# Patient Record
Sex: MALE | Race: BLACK OR AFRICAN AMERICAN | NOT HISPANIC OR LATINO | Employment: OTHER | URBAN - METROPOLITAN AREA
[De-identification: names, ages, dates, MRNs, and addresses within clinical notes are randomized per-mention and may not be internally consistent; named-entity substitution may affect disease eponyms.]

---

## 2020-06-17 ENCOUNTER — OFFICE VISIT (OUTPATIENT)
Dept: PRIMARY CARE CLINIC | Facility: CLINIC | Age: 55
End: 2020-06-17
Payer: OTHER GOVERNMENT

## 2020-06-17 VITALS
DIASTOLIC BLOOD PRESSURE: 71 MMHG | OXYGEN SATURATION: 96 % | HEART RATE: 88 BPM | TEMPERATURE: 98 F | RESPIRATION RATE: 18 BRPM | SYSTOLIC BLOOD PRESSURE: 148 MMHG

## 2020-06-17 DIAGNOSIS — U07.1 COVID-19: Primary | ICD-10-CM

## 2020-06-17 PROCEDURE — 99203 PR OFFICE/OUTPT VISIT, NEW, LEVL III, 30-44 MIN: ICD-10-PCS | Mod: S$GLB,,, | Performed by: EMERGENCY MEDICINE

## 2020-06-17 PROCEDURE — 99203 OFFICE O/P NEW LOW 30 MIN: CPT | Mod: S$GLB,,, | Performed by: EMERGENCY MEDICINE

## 2020-06-17 PROCEDURE — U0003 INFECTIOUS AGENT DETECTION BY NUCLEIC ACID (DNA OR RNA); SEVERE ACUTE RESPIRATORY SYNDROME CORONAVIRUS 2 (SARS-COV-2) (CORONAVIRUS DISEASE [COVID-19]), AMPLIFIED PROBE TECHNIQUE, MAKING USE OF HIGH THROUGHPUT TECHNOLOGIES AS DESCRIBED BY CMS-2020-01-R: HCPCS

## 2020-06-17 NOTE — PROGRESS NOTES
Subjective:        Time seen by provider: 1:16 PM on 06/17/2020    Omi Julian is a 55 y.o. male who presents for an evaluation of possible COVID-19. He is asymptomatic and has not been exposed to sick contacts but would like to be tested. No PMHx or PSHx.    Review of Systems   Constitutional: Negative for activity change, appetite change, fatigue and fever.   HENT: Negative for congestion, rhinorrhea and sore throat.    Respiratory: Negative for cough, chest tightness, shortness of breath and wheezing.    Cardiovascular: Negative for chest pain and palpitations.   Gastrointestinal: Negative for diarrhea, nausea and vomiting.   Musculoskeletal: Negative for arthralgias and myalgias.   Skin: Negative for rash.   Neurological: Negative for weakness, light-headedness and headaches.       Objective:      Physical Exam  Vitals signs and nursing note reviewed.   Constitutional:       General: He is not in acute distress.     Appearance: He is well-developed. He is not diaphoretic.   HENT:      Head: Normocephalic and atraumatic.      Nose: Nose normal.   Eyes:      Conjunctiva/sclera: Conjunctivae normal.   Neck:      Musculoskeletal: Normal range of motion.   Cardiovascular:      Rate and Rhythm: Normal rate and regular rhythm.      Heart sounds: Normal heart sounds. No murmur.   Pulmonary:      Effort: Pulmonary effort is normal. No respiratory distress.      Breath sounds: Normal breath sounds. No wheezing.   Musculoskeletal: Normal range of motion.   Skin:     General: Skin is warm and dry.   Neurological:      Mental Status: He is alert and oriented to person, place, and time.         Assessment and Plan:      Diagnoses and all orders for this visit:    COVID-19  -     COVID-19 Routine Screening  - Discharge home and await results.   - Return to clinic or ED for new or worsening symptoms.   - Follow-up with PCP as needed.     Scribe Attestation:   Varsha LEONE, am scribing for, and in the presence of,  Nicole Yeboah PA-C. I performed the above scribed service and the documentation accurately describes the services I performed. I attest to the accuracy of the note.    I, Nicole Yeboah PA-C, personally performed the services described in this documentation. All medical record entries made by the scribe were at my direction and in my presence.  I have reviewed the chart and agree that the record reflects my personal performance and is accurate and complete. Nicole Yeboah PA-C.  2:42 PM 06/17/2020

## 2020-06-17 NOTE — PATIENT INSTRUCTIONS
Instructions for Patients with Confirmed or Suspected COVID-19    If you are awaiting your test result, you will either be called or it will be released to the patient portal.  If you have any questions about your test, please visit [email or web address] or call our COVID-19 information line at [phone].      Preventing the Spread of Coronavirus Disease 2019 (COVID-19) in Homes and Residential Communities -- Patients     Prevention steps for people with confirmed or suspected COVID-19 (including persons under investigation) who do not need to be hospitalized and people with confirmed COVID-19 who were hospitalized and determined to be medically stable to go home.    Stay home except to get medical care.    Separate yourself from other people and animals in your home.    Call ahead before visiting your doctor.    Wear a face mask.    Cover your coughs and sneezes.    Clean your hands often.    Avoid sharing personal household items.    Clean all high-touch surfaces every day.    Monitor your symptoms. Seek prompt medical attention if your illness is worsening (e.g., difficulty breathing). Before seeking care, call your healthcare provider.    If you have a medical emergency and must call 911, notify the dispatcher that you have or are being evaluated for COVID-19. If possible, put on a face mask before emergency medical services arrive.    Use the following symptom-based strategy to return to normal activity following a suspected or confirmed case of COVID-19. Continue isolation until:   o At least 3 days (72 hours) have passed since recovery defined as resolution of fever without the use of fever-reducing medications and improvement in respiratory symptoms (e.g. cough, shortness of breath), and   o At least 10 days have passed since symptoms first appeared.     Precautions for household members, intimate partners and caregivers in a non-healthcare setting of a patient with symptomatic  laboratory-confirmed COVID-19 or a patient under investigation.     Household members, intimate partners and caregivers in a non-healthcare setting may have close contact with a person with symptomatic, laboratory-confirmed COVID-19 or a person under investigation. Close contacts should monitor their health; they should call their healthcare provider right away if they develop symptoms suggestive of COVID-19 (e.g., fever, cough, shortness of breath). Close contacts should also follow these recommendations:      Make sure that you understand and can help the patient follow their healthcare providers instructions for medication(s) and care. You should help the patient with basic needs in the home and provide support for getting groceries, prescriptions, and other personal needs.    Monitor the patients symptoms. If the patient is getting sicker, call his or her healthcare provider and tell them that the patient has laboratory-confirmed COVID-19. This will help the healthcare providers office take steps to keep people in the office or waiting room from getting infected. Ask the healthcare provider to call the local or [facility] health department for additional guidance. If the patient has a medical emergency and you need to call 911, notify the dispatch personnel that the patient has or is being evaluated for COVID-19.    Household members should stay in another room or be  from the patient as much as possible. Household members should use a separate bedroom and bathroom, if available.    Prohibit visitors who do not have an essential need to be in the home.    Household members should care for any pets. Do not handle pets or other animals while sick.    Make sure that shared spaces in the home have good air flow, such as by an air conditioner.    Perform hand hygiene frequently. Wash your hands often with soap and water for at least 20 seconds or use an alcohol-based hand  that contains 60 to  95% alcohol, covering all surfaces of your hands and rubbing them together until they feel dry. Soap and water are preferred if hands are visibly dirty.    Avoid touching your eyes, nose and mouth with unwashed hands.    The patient should wear a face mask when you are around other people. If the patient is not able to wear a face mask (for example, because it causes trouble breathing), you, as the caregiver, should wear a mask when you are in the same room as the patient.    Wear a disposable face mask and gloves when you touch or have contact with the patients blood, stool or body fluids, such as saliva, sputum, nasal mucus, vomit and urine.   o Throw out disposable face masks and gloves after using them. Do not reuse.   o When removing personal protective equipment, first remove and dispose of gloves. Then, immediately clean your hands with soap and water or alcohol-based hand . Next, remove and dispose of face mask, and immediately clean your hands again with soap and water or alcohol-based hand .    Avoid sharing household items with the patient. You should not share dishes, drinking glasses, cups, eating utensils, towels, bedding or other items. After the patient uses these items, you should wash them thoroughly (see below Wash laundry thoroughly).    Clean all high-touch surfaces, such as counters, tabletops, doorknobs, bathroom fixtures, toilets, phones, keyboards, tablets and bedside tables, every day. Also, clean any surfaces that may have blood, stool or body fluids on them.   o Use a household cleaning spray or wipe, according to the label instructions. Labels contain instructions for safe and effective use of the cleaning product including precautions you should take when applying the product, such as wearing gloves and making sure you have good ventilation during use of the product.    Wash laundry thoroughly.   o Immediately remove and wash clothes or bedding that have  blood, stool or body fluids on them.  o Wear disposable gloves while handling soiled items and keep soiled items away from your body. Clean your hands (with soap and water or an alcohol-based hand ) immediately after removing your gloves.   o Read and follow directions on labels of laundry or clothing items and detergent. In general, using a normal laundry detergent according to washing machine instructions and dry thoroughly using the warmest temperatures recommended on the clothing label.    Place all used disposable gloves, face masks and other contaminated items in a lined container before disposing of them with other household waste. Clean your hands (with soap and water or an alcohol-based hand ) immediately after handling these items. Soap and water should be used preferentially if hands are visibly dirty.   Discuss any additional questions with your state or local health department

## 2020-06-18 LAB — SARS-COV-2 RNA RESP QL NAA+PROBE: NOT DETECTED

## 2020-07-07 ENCOUNTER — HOSPITAL ENCOUNTER (EMERGENCY)
Facility: HOSPITAL | Age: 55
Discharge: HOME | End: 2020-07-07
Attending: EMERGENCY MEDICINE
Payer: OTHER GOVERNMENT

## 2020-07-07 VITALS
OXYGEN SATURATION: 96 % | BODY MASS INDEX: 40.43 KG/M2 | RESPIRATION RATE: 20 BRPM | SYSTOLIC BLOOD PRESSURE: 149 MMHG | DIASTOLIC BLOOD PRESSURE: 77 MMHG | TEMPERATURE: 98 F | HEIGHT: 74 IN | WEIGHT: 315 LBS | HEART RATE: 70 BPM

## 2020-07-07 DIAGNOSIS — T78.40XA ALLERGIC REACTION, INITIAL ENCOUNTER: Primary | ICD-10-CM

## 2020-07-07 PROCEDURE — 99284 EMERGENCY DEPT VISIT MOD MDM: CPT | Mod: 25

## 2020-07-07 PROCEDURE — 63600175 PHARM REV CODE 636 W HCPCS: Performed by: EMERGENCY MEDICINE

## 2020-07-07 PROCEDURE — 96372 THER/PROPH/DIAG INJ SC/IM: CPT

## 2020-07-07 PROCEDURE — 25000003 PHARM REV CODE 250: Performed by: EMERGENCY MEDICINE

## 2020-07-07 RX ORDER — PREDNISONE 20 MG/1
40 TABLET ORAL DAILY
Qty: 10 TABLET | Refills: 0 | Status: SHIPPED | OUTPATIENT
Start: 2020-07-07 | End: 2020-07-12

## 2020-07-07 RX ORDER — DEXAMETHASONE SODIUM PHOSPHATE 4 MG/ML
12 INJECTION, SOLUTION INTRA-ARTICULAR; INTRALESIONAL; INTRAMUSCULAR; INTRAVENOUS; SOFT TISSUE
Status: COMPLETED | OUTPATIENT
Start: 2020-07-07 | End: 2020-07-07

## 2020-07-07 RX ORDER — DIPHENHYDRAMINE HCL 25 MG
25 CAPSULE ORAL
Status: COMPLETED | OUTPATIENT
Start: 2020-07-07 | End: 2020-07-07

## 2020-07-07 RX ADMIN — DEXAMETHASONE SODIUM PHOSPHATE 12 MG: 4 INJECTION, SOLUTION INTRAMUSCULAR; INTRAVENOUS at 01:07

## 2020-07-07 RX ADMIN — DIPHENHYDRAMINE HYDROCHLORIDE 25 MG: 25 CAPSULE ORAL at 01:07

## 2020-07-07 NOTE — ED PROVIDER NOTES
Encounter Date: 7/7/2020       History     Chief Complaint   Patient presents with    Allergic Reaction     used hair dye to color beard yesterday / facial oral swelling  / no SOB     55-year-old male presents to the ER with facial swelling after using a hair dye for his beard yesterday.  No difficulty speaking swallowing no wheezing.  No extremity itching.  Patient has not taken any medication for this.  He is noncompliant with his home medications.        Review of patient's allergies indicates:  No Known Allergies  No past medical history on file.  No past surgical history on file.  No family history on file.  Social History     Tobacco Use    Smoking status: Not on file   Substance Use Topics    Alcohol use: Not on file    Drug use: Not on file     Review of Systems   HENT: Positive for facial swelling.    Respiratory: Negative for shortness of breath, wheezing and stridor.    Skin: Negative for rash.       Physical Exam     Initial Vitals [07/07/20 1248]   BP Pulse Resp Temp SpO2   (!) 149/77 70 20 98.2 °F (36.8 °C) 96 %      MAP       --         Physical Exam    Nursing note and vitals reviewed.  Constitutional: He appears well-developed and well-nourished. He is not diaphoretic.  Non-toxic appearance. He does not have a sickly appearance. He does not appear ill. No distress.   HENT:   Head: Normocephalic and atraumatic.   Very minimal swelling near the beard  No oral swelling  No stridor no wheezing   Eyes: EOM are normal.   Neck: Normal range of motion. Neck supple. Normal range of motion present. No neck rigidity.   Pulmonary/Chest: No respiratory distress.   Musculoskeletal: Normal range of motion.   Neurological: He is alert and oriented to person, place, and time.   Skin: Skin is warm and dry. No rash noted.   No urticaria   Psychiatric: He has a normal mood and affect. His behavior is normal. Judgment and thought content normal.         ED Course   Procedures  Labs Reviewed - No data to display        Imaging Results    None                            ED Course as of Jul 07 1459 Tue Jul 07, 2020   1250 BP(!): 149/77 [EF]   1250 Temp: 98.2 °F (36.8 °C) [EF]   1250 Temp src: Oral [EF]   1250 Pulse: 70 [EF]   1250 Resp: 20 [EF]   1250 SpO2: 96 % [EF]   1328 Patient presents for facial swelling and itching after using hair dye.  Symptoms are very mild.  He will be discharged home with oral steroids p.r.n. Benadryl.  No indication for airway involvement.    [EF]      ED Course User Index  [EF] Evans Dias MD                Clinical Impression:       ICD-10-CM ICD-9-CM   1. Allergic reaction, initial encounter  T78.40XA 995.3                                Evans Dias MD  07/07/20 3406

## 2020-07-07 NOTE — ED NOTES
Upon discharge, patient is AAOx4, no cardiac or respiratory complications. Follow up care and  Medications have been reviewed with patient and has been instructed to return to the ER if needed. Patient verbalized understanding and ambulated to the lobby without difficulty. MIKE RENNER.

## 2020-07-07 NOTE — ED NOTES
Omi Julian presents to the ED with c/o allergic reaction to [employer]. Patient reports that he has dyed his beard in the past, but has not had any problem. Patient did change the brand that he uses. Associated complaints are some facial swelling. No difficulty breathing or SOB. Patient is not in any distress. Mucous membranes are pink and moist. Skin is warm, dry and intact. Lungs are clear bilaterally, respirations are regular and unlabored. Denies SOB, cough, congestion or rhinorrhea. BS active x4, no tenderness with palpation, abd is soft and not distended. Denies any appetite or activity change. S1S2, capillary refill is < 2 seconds. Denies dysuria, difficulty urinating, frequency, numbness, tingling or weakness. ZURDO MCKEON

## 2020-12-06 ENCOUNTER — HOSPITAL ENCOUNTER (INPATIENT)
Facility: HOSPITAL | Age: 55
LOS: 4 days | Discharge: HOME | DRG: 300 | End: 2020-12-10
Attending: EMERGENCY MEDICINE | Admitting: INTERNAL MEDICINE
Payer: OTHER GOVERNMENT

## 2020-12-06 DIAGNOSIS — M86.9 OSTEOMYELITIS, UNSPECIFIED: ICD-10-CM

## 2020-12-06 DIAGNOSIS — M86.172 OTHER ACUTE OSTEOMYELITIS OF LEFT FOOT: ICD-10-CM

## 2020-12-06 DIAGNOSIS — R52 PAIN: ICD-10-CM

## 2020-12-06 DIAGNOSIS — E11.52 DIABETIC WET GANGRENE OF THE FOOT: ICD-10-CM

## 2020-12-06 DIAGNOSIS — R73.9 HYPERGLYCEMIA: ICD-10-CM

## 2020-12-06 DIAGNOSIS — L03.90 CELLULITIS: Primary | ICD-10-CM

## 2020-12-06 LAB
ALBUMIN SERPL BCP-MCNC: 4.1 G/DL (ref 3.5–5.2)
ALLENS TEST: ABNORMAL
ALP SERPL-CCNC: 107 U/L (ref 55–135)
ALT SERPL W/O P-5'-P-CCNC: 36 U/L (ref 10–44)
ANION GAP SERPL CALC-SCNC: 10 MMOL/L (ref 8–16)
AST SERPL-CCNC: 22 U/L (ref 10–40)
B-OH-BUTYR BLD STRIP-SCNC: 0.1 MMOL/L (ref 0–0.5)
BACTERIA #/AREA URNS HPF: NEGATIVE /HPF
BASOPHILS # BLD AUTO: 0.03 K/UL (ref 0–0.2)
BASOPHILS NFR BLD: 0.4 % (ref 0–1.9)
BILIRUB SERPL-MCNC: 0.8 MG/DL (ref 0.1–1)
BILIRUB UR QL STRIP: NEGATIVE
BUN SERPL-MCNC: 23 MG/DL (ref 6–20)
CALCIUM SERPL-MCNC: 9.5 MG/DL (ref 8.7–10.5)
CHLORIDE SERPL-SCNC: 94 MMOL/L (ref 95–110)
CLARITY UR: CLEAR
CO2 SERPL-SCNC: 24 MMOL/L (ref 23–29)
COLOR UR: COLORLESS
CREAT SERPL-MCNC: 1.4 MG/DL (ref 0.5–1.4)
CRP SERPL-MCNC: 0.98 MG/DL
DELSYS: ABNORMAL
DIFFERENTIAL METHOD: ABNORMAL
EOSINOPHIL # BLD AUTO: 0.1 K/UL (ref 0–0.5)
EOSINOPHIL NFR BLD: 1.5 % (ref 0–8)
ERYTHROCYTE [DISTWIDTH] IN BLOOD BY AUTOMATED COUNT: 12.5 % (ref 11.5–14.5)
EST. GFR  (AFRICAN AMERICAN): >60 ML/MIN/1.73 M^2
EST. GFR  (NON AFRICAN AMERICAN): 55.8 ML/MIN/1.73 M^2
GLUCOSE SERPL-MCNC: 505 MG/DL (ref 70–110)
GLUCOSE SERPL-MCNC: 539 MG/DL (ref 70–110)
GLUCOSE UR QL STRIP: ABNORMAL
HCO3 UR-SCNC: 26.7 MMOL/L (ref 24–28)
HCT VFR BLD AUTO: 39.6 % (ref 40–54)
HGB BLD-MCNC: 13 G/DL (ref 14–18)
HGB UR QL STRIP: NEGATIVE
HYALINE CASTS #/AREA URNS LPF: 0 /LPF
IMM GRANULOCYTES # BLD AUTO: 0.05 K/UL (ref 0–0.04)
IMM GRANULOCYTES NFR BLD AUTO: 0.6 % (ref 0–0.5)
KETONES UR QL STRIP: NEGATIVE
LACTATE SERPL-SCNC: 1.8 MMOL/L (ref 0.5–1.9)
LEUKOCYTE ESTERASE UR QL STRIP: NEGATIVE
LYMPHOCYTES # BLD AUTO: 2.4 K/UL (ref 1–4.8)
LYMPHOCYTES NFR BLD: 30.1 % (ref 18–48)
MCH RBC QN AUTO: 28.3 PG (ref 27–31)
MCHC RBC AUTO-ENTMCNC: 32.8 G/DL (ref 32–36)
MCV RBC AUTO: 86 FL (ref 82–98)
MICROSCOPIC COMMENT: ABNORMAL
MONOCYTES # BLD AUTO: 0.8 K/UL (ref 0.3–1)
MONOCYTES NFR BLD: 10.2 % (ref 4–15)
NEUTROPHILS # BLD AUTO: 4.5 K/UL (ref 1.8–7.7)
NEUTROPHILS NFR BLD: 57.2 % (ref 38–73)
NITRITE UR QL STRIP: NEGATIVE
NRBC BLD-RTO: 0 /100 WBC
PCO2 BLDA: 45.4 MMHG (ref 35–45)
PH SMN: 7.38 [PH] (ref 7.35–7.45)
PH UR STRIP: 7 [PH] (ref 5–8)
PLATELET # BLD AUTO: 208 K/UL (ref 150–350)
PMV BLD AUTO: 10.9 FL (ref 9.2–12.9)
PO2 BLDA: 60 MMHG (ref 40–60)
POC BE: 1 MMOL/L
POC SATURATED O2: 90 % (ref 95–100)
POC TCO2: 28 MMOL/L (ref 24–29)
POTASSIUM SERPL-SCNC: 4.4 MMOL/L (ref 3.5–5.1)
PROT SERPL-MCNC: 7.1 G/DL (ref 6–8.4)
PROT UR QL STRIP: NEGATIVE
RBC # BLD AUTO: 4.59 M/UL (ref 4.6–6.2)
RBC #/AREA URNS HPF: 2 /HPF (ref 0–4)
SAMPLE: ABNORMAL
SARS-COV-2 RDRP RESP QL NAA+PROBE: NEGATIVE
SITE: ABNORMAL
SODIUM SERPL-SCNC: 128 MMOL/L (ref 136–145)
SP GR UR STRIP: 1.02 (ref 1–1.03)
SQUAMOUS #/AREA URNS HPF: 0 /HPF
URN SPEC COLLECT METH UR: ABNORMAL
UROBILINOGEN UR STRIP-ACNC: NEGATIVE EU/DL
WBC # BLD AUTO: 7.93 K/UL (ref 3.9–12.7)
WBC #/AREA URNS HPF: 0 /HPF (ref 0–5)
YEAST URNS QL MICRO: ABNORMAL

## 2020-12-06 PROCEDURE — 83605 ASSAY OF LACTIC ACID: CPT

## 2020-12-06 PROCEDURE — 80053 COMPREHEN METABOLIC PANEL: CPT

## 2020-12-06 PROCEDURE — 96367 TX/PROPH/DG ADDL SEQ IV INF: CPT

## 2020-12-06 PROCEDURE — 82010 KETONE BODYS QUAN: CPT

## 2020-12-06 PROCEDURE — 99285 EMERGENCY DEPT VISIT HI MDM: CPT | Mod: 25

## 2020-12-06 PROCEDURE — 81001 URINALYSIS AUTO W/SCOPE: CPT

## 2020-12-06 PROCEDURE — 12000002 HC ACUTE/MED SURGE SEMI-PRIVATE ROOM

## 2020-12-06 PROCEDURE — 82803 BLOOD GASES ANY COMBINATION: CPT

## 2020-12-06 PROCEDURE — 83880 ASSAY OF NATRIURETIC PEPTIDE: CPT

## 2020-12-06 PROCEDURE — 82962 GLUCOSE BLOOD TEST: CPT

## 2020-12-06 PROCEDURE — 99900035 HC TECH TIME PER 15 MIN (STAT)

## 2020-12-06 PROCEDURE — 85651 RBC SED RATE NONAUTOMATED: CPT

## 2020-12-06 PROCEDURE — 96365 THER/PROPH/DIAG IV INF INIT: CPT

## 2020-12-06 PROCEDURE — 93010 EKG 12-LEAD: ICD-10-PCS | Mod: ,,, | Performed by: INTERNAL MEDICINE

## 2020-12-06 PROCEDURE — 93005 ELECTROCARDIOGRAM TRACING: CPT | Performed by: INTERNAL MEDICINE

## 2020-12-06 PROCEDURE — 86140 C-REACTIVE PROTEIN: CPT

## 2020-12-06 PROCEDURE — 36415 COLL VENOUS BLD VENIPUNCTURE: CPT

## 2020-12-06 PROCEDURE — 63600175 PHARM REV CODE 636 W HCPCS: Performed by: EMERGENCY MEDICINE

## 2020-12-06 PROCEDURE — 83036 HEMOGLOBIN GLYCOSYLATED A1C: CPT

## 2020-12-06 PROCEDURE — 99900031 HC PATIENT EDUCATION (STAT)

## 2020-12-06 PROCEDURE — 84145 PROCALCITONIN (PCT): CPT

## 2020-12-06 PROCEDURE — 85025 COMPLETE CBC W/AUTO DIFF WBC: CPT

## 2020-12-06 PROCEDURE — U0002 COVID-19 LAB TEST NON-CDC: HCPCS

## 2020-12-06 PROCEDURE — 87040 BLOOD CULTURE FOR BACTERIA: CPT

## 2020-12-06 PROCEDURE — 93010 ELECTROCARDIOGRAM REPORT: CPT | Mod: ,,, | Performed by: INTERNAL MEDICINE

## 2020-12-06 RX ORDER — CLINDAMYCIN HYDROCHLORIDE 300 MG/1
300 CAPSULE ORAL EVERY 6 HOURS
Status: ON HOLD | COMMUNITY
End: 2020-12-10 | Stop reason: HOSPADM

## 2020-12-06 RX ORDER — VANCOMYCIN HCL IN 5 % DEXTROSE 1G/250ML
2000 PLASTIC BAG, INJECTION (ML) INTRAVENOUS ONCE
Status: COMPLETED | OUTPATIENT
Start: 2020-12-06 | End: 2020-12-07

## 2020-12-06 RX ADMIN — SODIUM CHLORIDE, SODIUM LACTATE, POTASSIUM CHLORIDE, AND CALCIUM CHLORIDE 1000 ML: .6; .31; .03; .02 INJECTION, SOLUTION INTRAVENOUS at 11:12

## 2020-12-06 RX ADMIN — VANCOMYCIN HYDROCHLORIDE 2000 MG: 1 INJECTION, POWDER, LYOPHILIZED, FOR SOLUTION INTRAVENOUS at 11:12

## 2020-12-06 RX ADMIN — PIPERACILLIN AND TAZOBACTAM 4.5 G: 4; .5 INJECTION, POWDER, LYOPHILIZED, FOR SOLUTION INTRAVENOUS; PARENTERAL at 11:12

## 2020-12-07 PROBLEM — R73.9 HYPERGLYCEMIA: Status: ACTIVE | Noted: 2020-12-07

## 2020-12-07 PROBLEM — L08.9 DIABETIC FOOT INFECTION: Status: ACTIVE | Noted: 2020-12-07

## 2020-12-07 PROBLEM — E11.52 DIABETIC WET GANGRENE OF THE FOOT: Status: ACTIVE | Noted: 2020-12-07

## 2020-12-07 PROBLEM — E11.9 TYPE 2 DIABETES MELLITUS: Status: ACTIVE | Noted: 2020-12-07

## 2020-12-07 PROBLEM — I10 HYPERTENSION: Status: ACTIVE | Noted: 2020-12-07

## 2020-12-07 PROBLEM — E66.01 MORBID OBESITY WITH BMI OF 45.0-49.9, ADULT: Status: ACTIVE | Noted: 2020-12-07

## 2020-12-07 PROBLEM — E11.628 DIABETIC FOOT INFECTION: Status: ACTIVE | Noted: 2020-12-07

## 2020-12-07 LAB
ANION GAP SERPL CALC-SCNC: 6 MMOL/L (ref 8–16)
BASOPHILS # BLD AUTO: 0.02 K/UL (ref 0–0.2)
BASOPHILS NFR BLD: 0.3 % (ref 0–1.9)
BNP SERPL-MCNC: 8 PG/ML (ref 0–99)
BUN SERPL-MCNC: 22 MG/DL (ref 6–20)
CALCIUM SERPL-MCNC: 8.8 MG/DL (ref 8.7–10.5)
CHLORIDE SERPL-SCNC: 94 MMOL/L (ref 95–110)
CO2 SERPL-SCNC: 28 MMOL/L (ref 23–29)
CREAT SERPL-MCNC: 1.2 MG/DL (ref 0.5–1.4)
DIFFERENTIAL METHOD: ABNORMAL
EOSINOPHIL # BLD AUTO: 0.1 K/UL (ref 0–0.5)
EOSINOPHIL NFR BLD: 1.9 % (ref 0–8)
ERYTHROCYTE [DISTWIDTH] IN BLOOD BY AUTOMATED COUNT: 12.6 % (ref 11.5–14.5)
ERYTHROCYTE [SEDIMENTATION RATE] IN BLOOD BY WESTERGREN METHOD: 9 MM/HR (ref 0–10)
EST. GFR  (AFRICAN AMERICAN): >60 ML/MIN/1.73 M^2
EST. GFR  (NON AFRICAN AMERICAN): >60 ML/MIN/1.73 M^2
ESTIMATED AVG GLUCOSE: 226 MG/DL (ref 68–131)
GLUCOSE SERPL-MCNC: 322 MG/DL (ref 70–110)
GLUCOSE SERPL-MCNC: 334 MG/DL (ref 70–110)
GLUCOSE SERPL-MCNC: 371 MG/DL (ref 70–110)
GLUCOSE SERPL-MCNC: 377 MG/DL (ref 70–110)
GLUCOSE SERPL-MCNC: 415 MG/DL (ref 70–110)
GLUCOSE SERPL-MCNC: 438 MG/DL (ref 70–110)
HBA1C MFR BLD HPLC: 9.5 % (ref 4.5–6.2)
HCT VFR BLD AUTO: 37.9 % (ref 40–54)
HGB BLD-MCNC: 12.5 G/DL (ref 14–18)
IMM GRANULOCYTES # BLD AUTO: 0.06 K/UL (ref 0–0.04)
IMM GRANULOCYTES NFR BLD AUTO: 0.9 % (ref 0–0.5)
LACTATE SERPL-SCNC: 1 MMOL/L (ref 0.5–1.9)
LYMPHOCYTES # BLD AUTO: 2 K/UL (ref 1–4.8)
LYMPHOCYTES NFR BLD: 29.2 % (ref 18–48)
MAGNESIUM SERPL-MCNC: 1.8 MG/DL (ref 1.6–2.6)
MCH RBC QN AUTO: 29.1 PG (ref 27–31)
MCHC RBC AUTO-ENTMCNC: 33 G/DL (ref 32–36)
MCV RBC AUTO: 88 FL (ref 82–98)
MONOCYTES # BLD AUTO: 0.7 K/UL (ref 0.3–1)
MONOCYTES NFR BLD: 10.6 % (ref 4–15)
NEUTROPHILS # BLD AUTO: 3.9 K/UL (ref 1.8–7.7)
NEUTROPHILS NFR BLD: 57.1 % (ref 38–73)
NRBC BLD-RTO: 0 /100 WBC
PLATELET # BLD AUTO: 192 K/UL (ref 150–350)
PMV BLD AUTO: 10.6 FL (ref 9.2–12.9)
POTASSIUM SERPL-SCNC: 4.1 MMOL/L (ref 3.5–5.1)
PROCALCITONIN SERPL IA-MCNC: <0.05 NG/ML (ref 0–0.5)
RBC # BLD AUTO: 4.3 M/UL (ref 4.6–6.2)
SODIUM SERPL-SCNC: 128 MMOL/L (ref 136–145)
WBC # BLD AUTO: 6.78 K/UL (ref 3.9–12.7)

## 2020-12-07 PROCEDURE — 25000003 PHARM REV CODE 250: Performed by: NURSE PRACTITIONER

## 2020-12-07 PROCEDURE — 63600175 PHARM REV CODE 636 W HCPCS: Performed by: NURSE PRACTITIONER

## 2020-12-07 PROCEDURE — 12000002 HC ACUTE/MED SURGE SEMI-PRIVATE ROOM

## 2020-12-07 PROCEDURE — 63600175 PHARM REV CODE 636 W HCPCS: Performed by: INTERNAL MEDICINE

## 2020-12-07 PROCEDURE — 25000003 PHARM REV CODE 250: Performed by: FAMILY MEDICINE

## 2020-12-07 PROCEDURE — 83735 ASSAY OF MAGNESIUM: CPT

## 2020-12-07 PROCEDURE — 25000003 PHARM REV CODE 250: Performed by: INTERNAL MEDICINE

## 2020-12-07 PROCEDURE — 80048 BASIC METABOLIC PNL TOTAL CA: CPT

## 2020-12-07 PROCEDURE — 99222 PR INITIAL HOSPITAL CARE,LEVL II: ICD-10-PCS | Mod: ,,, | Performed by: PODIATRIST

## 2020-12-07 PROCEDURE — 83605 ASSAY OF LACTIC ACID: CPT

## 2020-12-07 PROCEDURE — 85025 COMPLETE CBC W/AUTO DIFF WBC: CPT

## 2020-12-07 PROCEDURE — 99222 1ST HOSP IP/OBS MODERATE 55: CPT | Mod: ,,, | Performed by: PODIATRIST

## 2020-12-07 PROCEDURE — C9399 UNCLASSIFIED DRUGS OR BIOLOG: HCPCS | Performed by: FAMILY MEDICINE

## 2020-12-07 PROCEDURE — 36415 COLL VENOUS BLD VENIPUNCTURE: CPT

## 2020-12-07 RX ORDER — MAGNESIUM SULFATE 1 G/100ML
1 INJECTION INTRAVENOUS
Status: DISCONTINUED | OUTPATIENT
Start: 2020-12-07 | End: 2020-12-10 | Stop reason: HOSPADM

## 2020-12-07 RX ORDER — POTASSIUM CHLORIDE 7.45 MG/ML
20 INJECTION INTRAVENOUS
Status: DISCONTINUED | OUTPATIENT
Start: 2020-12-07 | End: 2020-12-10 | Stop reason: HOSPADM

## 2020-12-07 RX ORDER — LANOLIN ALCOHOL/MO/W.PET/CERES
800 CREAM (GRAM) TOPICAL
Status: DISCONTINUED | OUTPATIENT
Start: 2020-12-07 | End: 2020-12-10 | Stop reason: HOSPADM

## 2020-12-07 RX ORDER — ACETAMINOPHEN 325 MG/1
650 TABLET ORAL EVERY 4 HOURS PRN
Status: DISCONTINUED | OUTPATIENT
Start: 2020-12-07 | End: 2020-12-10 | Stop reason: HOSPADM

## 2020-12-07 RX ORDER — INSULIN ASPART 100 [IU]/ML
1-10 INJECTION, SOLUTION INTRAVENOUS; SUBCUTANEOUS
Status: DISCONTINUED | OUTPATIENT
Start: 2020-12-07 | End: 2020-12-09

## 2020-12-07 RX ORDER — MAGNESIUM SULFATE HEPTAHYDRATE 40 MG/ML
4 INJECTION, SOLUTION INTRAVENOUS
Status: DISCONTINUED | OUTPATIENT
Start: 2020-12-07 | End: 2020-12-10 | Stop reason: HOSPADM

## 2020-12-07 RX ORDER — IBUPROFEN 200 MG
16 TABLET ORAL
Status: DISCONTINUED | OUTPATIENT
Start: 2020-12-07 | End: 2020-12-09

## 2020-12-07 RX ORDER — POTASSIUM CHLORIDE 20 MEQ/1
40 TABLET, EXTENDED RELEASE ORAL
Status: DISCONTINUED | OUTPATIENT
Start: 2020-12-07 | End: 2020-12-10 | Stop reason: HOSPADM

## 2020-12-07 RX ORDER — HYDROCODONE BITARTRATE AND ACETAMINOPHEN 10; 325 MG/1; MG/1
1 TABLET ORAL EVERY 6 HOURS PRN
Status: DISCONTINUED | OUTPATIENT
Start: 2020-12-07 | End: 2020-12-10 | Stop reason: HOSPADM

## 2020-12-07 RX ORDER — POTASSIUM CHLORIDE 7.45 MG/ML
40 INJECTION INTRAVENOUS
Status: DISCONTINUED | OUTPATIENT
Start: 2020-12-07 | End: 2020-12-10 | Stop reason: HOSPADM

## 2020-12-07 RX ORDER — SODIUM CHLORIDE 0.9 % (FLUSH) 0.9 %
10 SYRINGE (ML) INJECTION
Status: DISCONTINUED | OUTPATIENT
Start: 2020-12-07 | End: 2020-12-10 | Stop reason: HOSPADM

## 2020-12-07 RX ORDER — MAGNESIUM SULFATE HEPTAHYDRATE 40 MG/ML
2 INJECTION, SOLUTION INTRAVENOUS
Status: DISCONTINUED | OUTPATIENT
Start: 2020-12-07 | End: 2020-12-10 | Stop reason: HOSPADM

## 2020-12-07 RX ORDER — ACETAMINOPHEN 325 MG/1
650 TABLET ORAL EVERY 8 HOURS PRN
Status: DISCONTINUED | OUTPATIENT
Start: 2020-12-07 | End: 2020-12-10 | Stop reason: HOSPADM

## 2020-12-07 RX ORDER — POTASSIUM CHLORIDE 20 MEQ/1
20 TABLET, EXTENDED RELEASE ORAL
Status: DISCONTINUED | OUTPATIENT
Start: 2020-12-07 | End: 2020-12-10 | Stop reason: HOSPADM

## 2020-12-07 RX ORDER — SODIUM CHLORIDE 9 MG/ML
INJECTION, SOLUTION INTRAVENOUS CONTINUOUS
Status: DISCONTINUED | OUTPATIENT
Start: 2020-12-07 | End: 2020-12-10 | Stop reason: HOSPADM

## 2020-12-07 RX ORDER — ONDANSETRON 2 MG/ML
4 INJECTION INTRAMUSCULAR; INTRAVENOUS EVERY 8 HOURS PRN
Status: DISCONTINUED | OUTPATIENT
Start: 2020-12-07 | End: 2020-12-10 | Stop reason: HOSPADM

## 2020-12-07 RX ORDER — IBUPROFEN 200 MG
24 TABLET ORAL
Status: DISCONTINUED | OUTPATIENT
Start: 2020-12-07 | End: 2020-12-09

## 2020-12-07 RX ORDER — ENOXAPARIN SODIUM 100 MG/ML
40 INJECTION SUBCUTANEOUS EVERY 12 HOURS
Status: DISCONTINUED | OUTPATIENT
Start: 2020-12-07 | End: 2020-12-10 | Stop reason: HOSPADM

## 2020-12-07 RX ORDER — GLUCAGON 1 MG
1 KIT INJECTION
Status: DISCONTINUED | OUTPATIENT
Start: 2020-12-07 | End: 2020-12-09

## 2020-12-07 RX ADMIN — INSULIN ASPART 10 UNITS: 100 INJECTION, SOLUTION INTRAVENOUS; SUBCUTANEOUS at 12:12

## 2020-12-07 RX ADMIN — INSULIN ASPART 10 UNITS: 100 INJECTION, SOLUTION INTRAVENOUS; SUBCUTANEOUS at 08:12

## 2020-12-07 RX ADMIN — PIPERACILLIN SODIUM AND TAZOBACTAM SODIUM 4.5 G: 4; .5 INJECTION, POWDER, LYOPHILIZED, FOR SOLUTION INTRAVENOUS at 04:12

## 2020-12-07 RX ADMIN — ENOXAPARIN SODIUM 40 MG: 40 INJECTION SUBCUTANEOUS at 08:12

## 2020-12-07 RX ADMIN — SODIUM CHLORIDE 100 ML/HR: 0.9 INJECTION, SOLUTION INTRAVENOUS at 01:12

## 2020-12-07 RX ADMIN — PIPERACILLIN SODIUM AND TAZOBACTAM SODIUM 4.5 G: 4; .5 INJECTION, POWDER, LYOPHILIZED, FOR SOLUTION INTRAVENOUS at 08:12

## 2020-12-07 RX ADMIN — VANCOMYCIN HYDROCHLORIDE 2000 MG: 500 INJECTION, POWDER, LYOPHILIZED, FOR SOLUTION INTRAVENOUS at 12:12

## 2020-12-07 RX ADMIN — INSULIN ASPART 8 UNITS: 100 INJECTION, SOLUTION INTRAVENOUS; SUBCUTANEOUS at 04:12

## 2020-12-07 RX ADMIN — PIPERACILLIN SODIUM AND TAZOBACTAM SODIUM 4.5 G: 4; .5 INJECTION, POWDER, LYOPHILIZED, FOR SOLUTION INTRAVENOUS at 11:12

## 2020-12-07 RX ADMIN — INSULIN ASPART 5 UNITS: 100 INJECTION, SOLUTION INTRAVENOUS; SUBCUTANEOUS at 01:12

## 2020-12-07 RX ADMIN — SODIUM CHLORIDE: 0.9 INJECTION, SOLUTION INTRAVENOUS at 04:12

## 2020-12-07 RX ADMIN — ENOXAPARIN SODIUM 40 MG: 40 INJECTION SUBCUTANEOUS at 09:12

## 2020-12-07 RX ADMIN — INSULIN DETEMIR 20 UNITS: 100 INJECTION, SOLUTION SUBCUTANEOUS at 10:12

## 2020-12-07 NOTE — PROGRESS NOTES
VANCOMYCIN PHARMACOKINETIC NOTE:  Vancomycin Day # 1    Objective/Assessment:    Diagnosis/Indication for Vancomycin: Cellulitis     55 y.o., male; Actual Body Weight = (!) 168.7 kg (371 lb 14.7 oz).    The patient has the following labs:  12/7/2020 Estimated Creatinine Clearance: 97.3 mL/min (based on SCr of 1.4 mg/dL). Lab Results   Component Value Date    BUN 23 (H) 12/06/2020     Lab Results   Component Value Date    WBC 7.93 12/06/2020            Plan:  Adjust vancomycin dose and/or frequency based on the patient's actual weight and renal function:  Initiate Vancomycin 2000 mg IV every 12 hours.  Orders have been entered into patient's chart.        Vancomycin trough level has been ordered for 12/8 @11:00, prior to 4th dose.    Pharmacy will manage vancomycin therapy, monitor serum vancomycin levels, monitor renal function and adjust regimen as necessary.    Thank you for allowing us to participate in this patient's care.     Maxx Dodson 12/7/2020 1:10 AM  Department of Pharmacy  Ext [phone]

## 2020-12-07 NOTE — ASSESSMENT & PLAN NOTE
Systolic blood pressure in the 140s to 150s in the ED  Not on antihypertensive medication  Monitor

## 2020-12-07 NOTE — PROGRESS NOTES
"[facility]  Progress Note    Patient Name: Omi Julian  MRN: [id number]  Patient Class: IP- Inpatient   Admission Date: 12/6/2020 10:28 PM  Attending Physician: Jesi Valenzuela MD  Face-to-Face encounter date: 12/07/2020    Assessment & Plan:   Omi Julian is a 55 y.o. male with:    Diabetic Foot Ulcer with Gangrene   Suspected osteomyelitis  Pt with recent surgery and metal implant  - podiatry discussing with radiology re: best imaging technique  - vanc, zosyn  - possible repeat debridement per podiatry    DM2  - pt reported that he is not diabetic  - we discussed this in detail  - diabetic education   - SSI  - starting basal  - adjusting insulin prn    Other chronic conditions:  Home meds as appropriate  Electrolyte derangement:  Trending BMP, Mg; Replacement prn  DVT ppx:  lovenox  FULL CODE    Discharge Planning:     Will evaluate.    Subjective:      12/7:  Pt denies being diabetic.  He reports numbness but no pain in BLE.  No CP, no SOB.    Review of Systems   All systems reviewed and are negative except as noted per above.  Objective:     Physical Exam  /76   Pulse 77   Temp 98.6 °F (37 °C) (Oral)   Resp 18   Ht 6' 2" (1.88 m)   Wt (!) 168.7 kg (371 lb 14.7 oz)   SpO2 97%   BMI 47.75 kg/m²     Gen: alert, responsive  HEENT:  Eyes - no pallor  External ears with no lesions  Nares patent  Mouth - lips chapped  CV: RRR  Lungs: CTA B/L  Abd: +BS, soft, NT, ND  Ext: +edema, ulceration, induration of L foot  Skin: warm, dry  Neuro: grossly intact  Psych: pleasant    Recent Labs   Lab 12/07/20  0525   WBC 6.78   HGB 12.5*   HCT 37.9*        Recent Labs   Lab 12/06/20  2252 12/07/20  0525   CALCIUM 9.5 8.8   ALBUMIN 4.1  --    PROT 7.1  --    * 128*   K 4.4 4.1   CO2 24 28   CL 94* 94*   BUN 23* 22*   CREATININE 1.4 1.2   ALKPHOS 107  --    ALT 36  --    AST 22  --    BILITOT 0.8  --      No results found for: POCTGLUCOSE   Microbiology " Results (last 7 days)     Procedure Component Value Units Date/Time    Blood culture x two cultures. Draw prior to antibiotics. [[id number]] Collected: 12/06/20 2247    Order Status: Completed Specimen: Blood from Peripheral, Wrist, Right Updated: 12/07/20 0558     Blood Culture, Routine No Growth to date    Narrative:      Aerobic and anaerobic    Blood culture x two cultures. Draw prior to antibiotics. [[id number]] Collected: 12/06/20 2247    Order Status: Completed Specimen: Blood from Peripheral, Hand, Left Updated: 12/07/20 0558     Blood Culture, Routine No Growth to date    Narrative:      Aerobic and anaerobic        X-Ray Chest AP Portable   Final Result      X-Ray Foot Complete Left   Final Result          All labs, images, and other studies - including those not included in this note -- were reviewed personally by me.    Active Hospital Problems    Diagnosis  POA    *Diabetic foot infection [E11.628, L08.9]  Yes    Type 2 diabetes mellitus [E11.9]  Yes    Hypertension [I10]  Yes    Morbid obesity with BMI of 45.0-49.9, adult [E66.01, Z68.42]  Not Applicable    Cellulitis [L03.90]  Yes      Resolved Hospital Problems   No resolved problems to display.        Inpatient medications  Scheduled Meds:   enoxparin  40 mg Subcutaneous Q12H    piperacillin-tazobactam (ZOSYN) IVPB  4.5 g Intravenous Q8H    sodium chloride 0.9%  1,000 mL Intravenous ED 1 Time    vancomycin (VANCOCIN) IVPB  2,000 mg Intravenous Q12H     Continuous Infusions:   sodium chloride 0.9% 100 mL/hr (12/07/20 0122)     PRN Meds:.acetaminophen, acetaminophen, calcium chloride IVPB, calcium chloride IVPB, calcium chloride IVPB, dextrose 50%, dextrose 50%, glucagon (human recombinant), glucose, glucose, HYDROcodone-acetaminophen, insulin aspart U-100, magnesium oxide, magnesium sulfate IVPB, magnesium sulfate IVPB, magnesium sulfate IVPB, magnesium sulfate IVPB, ondansetron, potassium chloride in water, potassium chloride in water,  potassium chloride in water, potassium chloride in water, potassium chloride, potassium chloride, potassium chloride, potassium chloride, promethazine (PHENERGAN) IVPB, sodium chloride 0.9%, sodium phosphate IVPB, sodium phosphate IVPB, sodium phosphate IVPB, sodium phosphate IVPB, sodium phosphate IVPB, Pharmacy to dose Vancomycin consult **AND** vancomycin - pharmacy to dose    Above encounter included review of the medical records, interviewing and examining the patient face-to-face, discussion with family and other health care providers, ordering and interpreting lab/test results and formulating a plan of care.     Jesi Valenzuela MD  [facility] Hospitalist

## 2020-12-07 NOTE — CONSULTS
Left foot dark discoloration of the great, 2nd, 3rd toes.  2nd toe is swollen with a 0.4x1.6x0.3cm plantar ulcer yellow slough wound bed, peeling plantar toe.  Tenderness noted.  Cleaned and dried.  Waiting for orders from Dr. Andrade

## 2020-12-07 NOTE — ED PROVIDER NOTES
Encounter Date: 12/6/2020       History     Chief Complaint   Patient presents with    Hyperglycemia     500+ blood glocose. surgery on foot with poor healing. BG >300 x 4 days     55-year-old male presented emergency department with elevated blood sugar over the past 3-4 days.  Patient had consistently high blood sugars over 300 with a past 3-4 days.  Patient also has a nonhealing ulcer and wound of the left foot and has a dressing in place.  Patient on clindamycin for treatment of that.  Patient had surgery of the left foot about few months ago and since then has persistent infection.  Denies fever or chills or nausea vomiting.  Denies chest pain or shortness of breath or abdominal pain or weakness or numbness.        Review of patient's allergies indicates:  No Known Allergies  No past medical history on file.  No past surgical history on file.  No family history on file.  Social History     Tobacco Use    Smoking status: Not on file   Substance Use Topics    Alcohol use: Not on file    Drug use: Not on file     Review of Systems   Constitutional: Positive for fatigue.   HENT: Negative.    Eyes: Negative.    Respiratory: Negative.    Cardiovascular: Negative.  Negative for chest pain.   Gastrointestinal: Negative.    Endocrine: Positive for polydipsia, polyphagia and polyuria.   Genitourinary: Negative.    Musculoskeletal: Negative.    Skin: Negative.    Allergic/Immunologic: Negative.    Neurological: Negative.    Hematological: Negative.    Psychiatric/Behavioral: Negative.    All other systems reviewed and are negative.      Physical Exam     Initial Vitals   BP Pulse Resp Temp SpO2   12/06/20 2230 12/06/20 2230 12/06/20 2230 12/06/20 2231 12/06/20 2230   (!) 147/81 92 18 98.3 °F (36.8 °C) 98 %      MAP       --                Physical Exam    Nursing note and vitals reviewed.  Constitutional: He appears well-developed and well-nourished.   HENT:   Head: Normocephalic and atraumatic.   Nose: Nose normal.    Eyes: Conjunctivae and EOM are normal.   Neck: Normal range of motion. No tracheal deviation present.   Cardiovascular: Normal rate, regular rhythm, normal heart sounds and intact distal pulses. Exam reveals no friction rub.    No murmur heard.  Pulmonary/Chest: Breath sounds normal. No respiratory distress. He has no wheezes. He has no rales.   Abdominal: Soft. He exhibits no distension. There is no abdominal tenderness.   Musculoskeletal: Normal range of motion. No edema.      Comments: Left foot dark and discolored and drainage of pus from the toes which appear to be infected.  Intact distal pulses.   Neurological: He is alert and oriented to person, place, and time. He has normal strength. No sensory deficit. GCS score is 15. GCS eye subscore is 4. GCS verbal subscore is 5. GCS motor subscore is 6.   Skin: Skin is warm and dry. Capillary refill takes less than 2 seconds.   Psychiatric: He has a normal mood and affect. Thought content normal.         ED Course   Procedures  Labs Reviewed   CBC W/ AUTO DIFFERENTIAL - Abnormal; Notable for the following components:       Result Value    RBC 4.59 (*)     Hemoglobin 13.0 (*)     Hematocrit 39.6 (*)     Immature Granulocytes 0.6 (*)     Immature Grans (Abs) 0.05 (*)     All other components within normal limits   COMPREHENSIVE METABOLIC PANEL - Abnormal; Notable for the following components:    Sodium 128 (*)     Chloride 94 (*)     Glucose 539 (*)     BUN 23 (*)     eGFR if non  55.8 (*)     All other components within normal limits    Narrative:      Glucose  critical result(s) called and verbal readback obtained from   Alexis Curran RN ER. by TC1 12/06/2020 23:35   URINALYSIS, REFLEX TO URINE CULTURE - Abnormal; Notable for the following components:    Color, UA Colorless (*)     Glucose, UA 4+ (*)     All other components within normal limits    Narrative:     Specimen Source->Urine   C-REACTIVE PROTEIN - Abnormal; Notable for the following  components:    CRP 0.98 (*)     All other components within normal limits   URINALYSIS MICROSCOPIC - Abnormal; Notable for the following components:    Hyaline Casts, UA 0.00 (*)     All other components within normal limits    Narrative:     Specimen Source->Urine   POCT GLUCOSE - Abnormal; Notable for the following components:    POC Glucose 505 (*)     All other components within normal limits   ISTAT PROCEDURE - Abnormal; Notable for the following components:    POC PCO2 45.4 (*)     POC SATURATED O2 90 (*)     All other components within normal limits   CULTURE, BLOOD   CULTURE, BLOOD   BETA - HYDROXYBUTYRATE, SERUM   LACTIC ACID, PLASMA   SARS-COV-2 RNA AMPLIFICATION, QUAL   PROCALCITONIN   SEDIMENTATION RATE   POCT GLUCOSE MONITORING CONTINUOUS     EKG Readings: (Independently Interpreted)   Rhythm: Normal Sinus Rhythm. Ectopy: No Ectopy. Conduction: Normal. ST Segments: Normal ST Segments. T Waves: Normal. Clinical Impression: Normal Sinus Rhythm       Imaging Results          X-Ray Chest AP Portable (In process)                X-Ray Foot Complete Left (In process)               X-Rays:   Independently Interpreted Readings:   Other Readings:  Foot xray osteomyelitis  cxr wnl    Medical Decision Making:   Differential Diagnosis:   Patient with hyperglycemia and has diabetes which likely is new onset.  Patient does not have previous diagnosis of diabetes.  Patient also has nonhealing left foot wound and gangrenous appearing toes with open wound at the base of the toes with open wound with drainage of pus and given the presentation of this for the past 2-3 months likely developed osteomyelitis.  Patient also failing outpatient treatment with antibiotics.  Given this presentation will treat hyperglycemia and nonhealing wound and osteomyelitis will treat with antibiotics.  Hospital Medicine consulted for evaluation for admission and further management.  X-ray it done and patient would likely need treatment and  wound debridement and evaluation by Podiatry  Clinical Tests:   Lab Tests: Reviewed  Radiological Study: Reviewed                             Clinical Impression:       ICD-10-CM ICD-9-CM   1. Other acute osteomyelitis of left foot  M86.172 730.07   2. Hyperglycemia  R73.9 790.29   3. Pain  R52 780.96                                               Vik Cedeno MD  12/06/20 2545       Vik Cedeno MD  12/06/20 7692

## 2020-12-07 NOTE — HPI
"55-year-old male morbidly obese male with history of SELMA, on CPAP ("should use more often")  Presented to the emergency department with elevated blood sugar, over 300s for the past 3-4 days.   Also reports nonhealing ulcers on the left foot. [facility]  States that he was given 120 tablets of clindamycin qid in October. Completed a couple days ago.  [facility] about [facility] weeks ago and was given "oxygen boot therapy"  Reports fatigue with polyphasia, polydipsia, and polyuria  Has approximately 40 lbs weight gain in the past 4 months  Denies fever, chills  Denies shortness of breath, chest pain, abdominal pain, nausea, vomiting, diarrhea, dysuria  Reports occasional mild to moderate pain on left foot, able to bear weight    Drinks about 2 hard liquor daily  Denies tobacco or recreational drugs  Not on routine medication  Told that he had "boarderline diabetes" many years ago.     In the ED:  Afebrile  Mildly hypertensive  WBC 7.93, hemoglobin 13, hematocrit 39.6, platelet 200  Sodium 128, potassium 4.8, glucose 539  Corrected sodium 135  Lactic acid 1.8  Beta hydroxy butyrate 0.1  Urine with 4+ glucose glucose, no urinary tract infection  Blood cultures done  Started on IV Zosyn  and IV vancomycin  Received 1 L IV bolus in the ED  "

## 2020-12-07 NOTE — ASSESSMENT & PLAN NOTE
Not in DKA  Await for hemoglobin A1c  Received 1 L IV fluid in the ED  Continue normal saline at 100 mL/hr  Monitor blood glucose  Medium dose sliding scale insulin  Diabetic diet

## 2020-12-07 NOTE — PLAN OF CARE
Initial discharge planning assessment completed at bedside with patient. Independent. [facility]. No needs identified at this time. Case management following.      12/07/20 1120   Discharge Assessment   Assessment Type Discharge Planning Assessment   Confirmed/corrected address and phone number on facesheet? Yes   Assessment information obtained from? Patient   Expected Length of Stay (days) 3   Communicated expected length of stay with patient/caregiver yes   Prior to hospitilization cognitive status: Alert/Oriented   Prior to hospitalization functional status: Independent   Current cognitive status: Alert/Oriented   Current Functional Status: Independent   Lives With significant other   Able to Return to Prior Arrangements yes   Is patient able to care for self after discharge? Yes   Readmission Within the Last 30 Days no previous admission in last 30 days   Patient currently being followed by outpatient case management? No   Patient currently receives any other outside agency services? No   Equipment Currently Used at Home none   [facility]   Do you have any problems affording any of your prescribed medications? No   Is the patient taking medications as prescribed? yes   Does the patient have transportation home? Yes   Transportation Anticipated family or friend will provide   Discharge Plan A Home   Discharge Plan B Home   DME Needed Upon Discharge  none   Patient/Family in Agreement with Plan yes

## 2020-12-07 NOTE — ASSESSMENT & PLAN NOTE
ED physician reports purulent drainage from toes  Concerns for osteomyelitis  Completed clindamycin 120 tablets, qid  IV Zosyn and IV vancomycin started in the ED, will continue  Follow blood cultures  Consult podiatry

## 2020-12-07 NOTE — PLAN OF CARE
Problem: Adult Inpatient Plan of Care  Goal: Plan of Care Review  Outcome: Ongoing, Progressing  Goal: Patient-Specific Goal (Individualization)  Outcome: Ongoing, Progressing  Goal: Absence of Hospital-Acquired Illness or Injury  Outcome: Ongoing, Progressing  Goal: Optimal Comfort and Wellbeing  Outcome: Ongoing, Progressing  Goal: Readiness for Transition of Care  Outcome: Ongoing, Progressing  Goal: Rounds/Family Conference  Outcome: Ongoing, Progressing     Problem: Bariatric Environmental Safety  Goal: Safety Maintained with Care  Outcome: Ongoing, Progressing     Problem: Fall Injury Risk  Goal: Absence of Fall and Fall-Related Injury  Outcome: Ongoing, Progressing     Problem: Skin Injury Risk Increased  Goal: Skin Health and Integrity  Outcome: Ongoing, Progressing     Problem: Diabetes Comorbidity  Goal: Blood Glucose Level Within Desired Range  Outcome: Ongoing, Progressing

## 2020-12-07 NOTE — SUBJECTIVE & OBJECTIVE
Past Medical History:   Diagnosis Date    Sleep apnea        History reviewed. No pertinent surgical history.    Review of patient's allergies indicates:  No Known Allergies    No current facility-administered medications on file prior to encounter.      Current Outpatient Medications on File Prior to Encounter   Medication Sig    clindamycin (CLEOCIN) 300 MG capsule Take 300 mg by mouth every 6 (six) hours.     Family History     Problem Relation (Age of Onset)    Diabetes Mother, Father    Hypertension Mother, Father        Tobacco Use    Smoking status: Never Smoker   Substance and Sexual Activity    Alcohol use: Yes     Alcohol/week: 14.0 standard drinks     Types: 14 Shots of liquor per week    Drug use: Never    Sexual activity: Not on file     Review of Systems   All other systems reviewed and are negative.    Objective:     Vital Signs (Most Recent):  Temp: 98.3 °F (36.8 °C) (12/06/20 2231)  Pulse: 87 (12/06/20 2300)  Resp: 18 (12/06/20 2230)  BP: (!) 144/74 (12/06/20 2300)  SpO2: 96 % (12/06/20 2300) Vital Signs (24h Range):  Temp:  [98.3 °F (36.8 °C)] 98.3 °F (36.8 °C)  Pulse:  [87-92] 87  Resp:  [18] 18  SpO2:  [96 %-98 %] 96 %  BP: (144-147)/(74-81) 144/74     Weight: (!) 167.8 kg (370 lb)  Body mass index is 46.25 kg/m².    Physical Exam  Vitals signs reviewed.   Constitutional:       Appearance: He is obese. He is not ill-appearing or diaphoretic.      Comments: A large and obese male   HENT:      Head: Normocephalic and atraumatic.   Eyes:      General: No scleral icterus.        Right eye: No discharge.         Left eye: No discharge.      Conjunctiva/sclera: Conjunctivae normal.   Neck:      Musculoskeletal: No muscular tenderness.      Comments: Short thick neck  Cardiovascular:      Rate and Rhythm: Normal rate and regular rhythm.      Pulses: Normal pulses.      Heart sounds: Normal heart sounds.   Pulmonary:      Effort: Pulmonary effort is normal.      Breath sounds: No wheezing or rales.    Abdominal:      Comments: Large   Genitourinary:     Comments: No [place]  Musculoskeletal:      Left lower leg: Left lower leg edema: Edema left foot.   Skin:     Comments: Dark discoloration left foot/toes, see image   Neurological:      General: No focal deficit present.      Mental Status: He is alert and oriented to person, place, and time.   Psychiatric:         Mood and Affect: Mood normal.         Behavior: Behavior normal.             Significant Labs:   ABGs:   Recent Labs   Lab 12/06/20 2259   PH 7.377   PCO2 45.4*   HCO3 26.7   POCSATURATED 90*   BE 1     CBC:   Recent Labs   Lab 12/06/20 2252   WBC 7.93   HGB 13.0*   HCT 39.6*        CMP:   Recent Labs   Lab 12/06/20 2252   *   K 4.4   CL 94*   CO2 24   *   BUN 23*   CREATININE 1.4   CALCIUM 9.5   PROT 7.1   ALBUMIN 4.1   BILITOT 0.8   ALKPHOS 107   AST 22   ALT 36   ANIONGAP 10   EGFRNONAA 55.8*     Lactic Acid:   Recent Labs   Lab 12/06/20  2252   LACTATE 1.8     Urine Studies:   Recent Labs   Lab 12/06/20  2316   COLORU Colorless*   APPEARANCEUA Clear   PHUR 7.0   SPECGRAV 1.025   PROTEINUA Negative   GLUCUA 4+*   KETONESU Negative   BILIRUBINUA Negative   OCCULTUA Negative   NITRITE Negative   UROBILINOGEN Negative   LEUKOCYTESUR Negative   RBCUA 2   WBCUA 0   BACTERIA Negative   SQUAMEPITHEL 0   HYALINECASTS 0.00*     All pertinent labs within the past 24 hours have been reviewed.    Significant Imaging: I have reviewed all pertinent imaging results/findings within the past 24 hours.   Chest radiograph, no obvious infiltrates or overt heart failure  Left foot, report pending

## 2020-12-07 NOTE — H&P
"[facility] Medicine  History & Physical    Patient Name: Omi Julian  MRN: [id number]  Admission Date: 12/6/2020  Attending Physician: Eber Giordano MD   Primary Care Provider: [facility]         Patient information was obtained from patient and ER records.     Subjective:     Principal Problem:Hyperglycemia    Chief Complaint:   Chief Complaint   Patient presents with    Hyperglycemia     500+ blood glocose. surgery on foot with poor healing. BG >300 x 4 days        HPI: 55-year-old male morbidly obese male with history of SELMA, on CPAP ("should use more often")  Presented to the emergency department with elevated blood sugar, over 300s for the past 3-4 days.   Also reports nonhealing ulcers on the left foot. [facility]  States that he was given 120 tablets of clindamycin qid in October. Completed a couple days ago.  [facility] about [facility] ago and was given "oxygen boot therapy"  Reports fatigue with polyphasia, polydipsia, and polyuria  Has approximately 40 lbs weight gain in the past 4 months  Denies fever, chills  Denies shortness of breath, chest pain, abdominal pain, nausea, vomiting, diarrhea, dysuria  Reports occasional mild to moderate pain on left foot, able to bear weight    Drinks about 2 hard liquor daily  Denies tobacco or recreational drugs  Not on routine medication  Told that he had "boarderline diabetes" many years ago.     In the ED:  Afebrile  Mildly hypertensive  WBC 7.93, hemoglobin 13, hematocrit 39.6, platelet 200  Sodium 128, potassium 4.8, glucose 539  Corrected sodium 135  Lactic acid 1.8  Beta hydroxy butyrate 0.1  Urine with 4+ glucose glucose, no urinary tract infection  Blood cultures done  Started on IV Zosyn  and IV vancomycin  Received 1 L IV bolus in the ED    Past Medical History:   Diagnosis Date    Sleep apnea        History reviewed. No pertinent surgical " history.    Review of patient's allergies indicates:  No Known Allergies    No current facility-administered medications on file prior to encounter.      Current Outpatient Medications on File Prior to Encounter   Medication Sig    clindamycin (CLEOCIN) 300 MG capsule Take 300 mg by mouth every 6 (six) hours.     Family History     Problem Relation (Age of Onset)    Diabetes Mother, Father    Hypertension Mother, Father        Tobacco Use    Smoking status: Never Smoker   Substance and Sexual Activity    Alcohol use: Yes     Alcohol/week: 14.0 standard drinks     Types: 14 Shots of liquor per week    Drug use: Never    Sexual activity: Not on file     Review of Systems   All other systems reviewed and are negative.    Objective:     Vital Signs (Most Recent):  Temp: 98.3 °F (36.8 °C) (12/06/20 2231)  Pulse: 87 (12/06/20 2300)  Resp: 18 (12/06/20 2230)  BP: (!) 144/74 (12/06/20 2300)  SpO2: 96 % (12/06/20 2300) Vital Signs (24h Range):  Temp:  [98.3 °F (36.8 °C)] 98.3 °F (36.8 °C)  Pulse:  [87-92] 87  Resp:  [18] 18  SpO2:  [96 %-98 %] 96 %  BP: (144-147)/(74-81) 144/74     Weight: (!) 167.8 kg (370 lb)  Body mass index is 46.25 kg/m².    Physical Exam  Vitals signs reviewed.   Constitutional:       Appearance: He is obese. He is not ill-appearing or diaphoretic.      Comments: A large and obese male   HENT:      Head: Normocephalic and atraumatic.   Eyes:      General: No scleral icterus.        Right eye: No discharge.         Left eye: No discharge.      Conjunctiva/sclera: Conjunctivae normal.   Neck:      Musculoskeletal: No muscular tenderness.      Comments: Short thick neck  Cardiovascular:      Rate and Rhythm: Normal rate and regular rhythm.      Pulses: Normal pulses.      Heart sounds: Normal heart sounds.   Pulmonary:      Effort: Pulmonary effort is normal.      Breath sounds: No wheezing or rales.   Abdominal:      Comments: Large   Genitourinary:     Comments: No [place]  Musculoskeletal:      Left  lower leg: Left lower leg edema: Edema left foot.   Skin:     Comments: Dark discoloration left foot/toes, see image   Neurological:      General: No focal deficit present.      Mental Status: He is alert and oriented to person, place, and time.   Psychiatric:         Mood and Affect: Mood normal.         Behavior: Behavior normal.             Significant Labs:   ABGs:   Recent Labs   Lab 12/06/20  2259   PH 7.377   PCO2 45.4*   HCO3 26.7   POCSATURATED 90*   BE 1     CBC:   Recent Labs   Lab 12/06/20  2252   WBC 7.93   HGB 13.0*   HCT 39.6*        CMP:   Recent Labs   Lab 12/06/20  2252   *   K 4.4   CL 94*   CO2 24   *   BUN 23*   CREATININE 1.4   CALCIUM 9.5   PROT 7.1   ALBUMIN 4.1   BILITOT 0.8   ALKPHOS 107   AST 22   ALT 36   ANIONGAP 10   EGFRNONAA 55.8*     Lactic Acid:   Recent Labs   Lab 12/06/20  2252   LACTATE 1.8     Urine Studies:   Recent Labs   Lab 12/06/20  2316   COLORU Colorless*   APPEARANCEUA Clear   PHUR 7.0   SPECGRAV 1.025   PROTEINUA Negative   GLUCUA 4+*   KETONESU Negative   BILIRUBINUA Negative   OCCULTUA Negative   NITRITE Negative   UROBILINOGEN Negative   LEUKOCYTESUR Negative   RBCUA 2   WBCUA 0   BACTERIA Negative   SQUAMEPITHEL 0   HYALINECASTS 0.00*     All pertinent labs within the past 24 hours have been reviewed.    Significant Imaging: I have reviewed all pertinent imaging results/findings within the past 24 hours.   Chest radiograph, no obvious infiltrates or overt heart failure  Left foot, report pending              Assessment/Plan:     * Hyperglycemia  Not in DKA  Await for hemoglobin A1c  Received 1 L IV fluid in the ED  Continue normal saline at 100 mL/hr  Monitor blood glucose  Medium dose sliding scale insulin  Diabetic diet      Cellulitis  ED physician reports purulent drainage from toes  Concerns for osteomyelitis  Completed clindamycin 120 tablets, qid  IV Zosyn and IV vancomycin started in the ED, will continue  Follow blood cultures  Consult  podiatry      Hypertension  Systolic blood pressure in the 140s to 150s in the ED  Not on antihypertensive medication  Monitor      Morbid obesity with BMI of 45.0-49.9, adult  Need weight reduction      VTE Risk Mitigation (From admission, onward)         Ordered     enoxaparin injection 40 mg  Every 12 hours      12/07/20 0017     IP VTE HIGH RISK PATIENT  Once      12/07/20 0017                   COLBY Whatley  Department of Hospital Medicine   [facility]

## 2020-12-07 NOTE — PLAN OF CARE
Problem: Adult Inpatient Plan of Care  Goal: Plan of Care Review  Outcome: Ongoing, Progressing  Goal: Patient-Specific Goal (Individualization)  Outcome: Ongoing, Progressing  Goal: Absence of Hospital-Acquired Illness or Injury  Outcome: Ongoing, Progressing  Goal: Optimal Comfort and Wellbeing  Outcome: Ongoing, Progressing  Goal: Readiness for Transition of Care  Outcome: Ongoing, Progressing  Goal: Rounds/Family Conference  Outcome: Ongoing, Progressing     Problem: Bariatric Environmental Safety  Goal: Safety Maintained with Care  Outcome: Ongoing, Progressing     Problem: Fall Injury Risk  Goal: Absence of Fall and Fall-Related Injury  Outcome: Ongoing, Progressing     Problem: Skin Injury Risk Increased  Goal: Skin Health and Integrity  Outcome: Ongoing, Progressing

## 2020-12-07 NOTE — RESPIRATORY THERAPY
This note also relates to the following rows which could not be included:  SpO2 - Cannot attach notes to unvalidated device data  Pulse - Cannot attach notes to unvalidated device data       12/06/20 2257   Patient Assessment/Suction   Level of Consciousness (AVPU) alert   PRE-TX-O2   O2 Device (Oxygen Therapy) room air   Education   $ Education Other (see comment);15 min  (vbg)   Labs   $ Was an ABG obtained? Venous Line;ISTAT - Blood gas   $ Labs Tech Time 15 min   Respiratory Evaluation   $ Care Plan Tech Time 15 min   Evaluation For New Orders        12/06/20 2257   Patient Assessment/Suction   Level of Consciousness (AVPU) alert   PRE-TX-O2   O2 Device (Oxygen Therapy) room air   Education   $ Education Other (see comment);15 min  (vbg)   Labs   $ Was an ABG obtained? Venous Line;ISTAT - Blood gas   $ Labs Tech Time 15 min   Respiratory Evaluation   $ Care Plan Tech Time 15 min   Evaluation For New Orders

## 2020-12-07 NOTE — CONSULTS
[facility]  Podiatry  Consult Note    Patient Name: Omi Julian  MRN: [id number]  Admission Date: 12/6/2020  Hospital Length of Stay: 1 days  Attending Physician: Eber Giordano MD  Primary Care Provider: [facility]     Inpatient consult to Podiatry  Consult performed by: Enrique Andrade DPM  Consult ordered by: COLBY Limon        Subjective:     History of Present Illness:  Patient is status post foot surgery left foot in September through the [facility].  He states he has had swelling and discoloration of foot ever since surgery.  The discoloration has fluctuated and seems to be a little bit better actually the nerve was a few days ago.  [facility] and local wound care and local oxygen therapy.  He states it has not resolve the problem.  Blood sugars have been extremely out a control is admitted through the emergency department for evaluation.  I am being asked look at the left foot decide with this is infection or with the problem might be.    Scheduled Meds:   enoxparin  40 mg Subcutaneous Q12H    piperacillin-tazobactam (ZOSYN) IVPB  4.5 g Intravenous Q8H    sodium chloride 0.9%  1,000 mL Intravenous ED 1 Time    vancomycin (VANCOCIN) IVPB  2,000 mg Intravenous Q12H     Continuous Infusions:   sodium chloride 0.9% 100 mL/hr (12/07/20 0122)     PRN Meds:acetaminophen, acetaminophen, calcium chloride IVPB, calcium chloride IVPB, calcium chloride IVPB, dextrose 50%, dextrose 50%, glucagon (human recombinant), glucose, glucose, HYDROcodone-acetaminophen, insulin aspart U-100, magnesium oxide, magnesium sulfate IVPB, magnesium sulfate IVPB, magnesium sulfate IVPB, magnesium sulfate IVPB, ondansetron, potassium chloride in water, potassium chloride in water, potassium chloride in water, potassium chloride in water, potassium chloride, potassium chloride, potassium chloride, potassium chloride,  promethazine (PHENERGAN) IVPB, sodium chloride 0.9%, sodium phosphate IVPB, sodium phosphate IVPB, sodium phosphate IVPB, sodium phosphate IVPB, sodium phosphate IVPB, Pharmacy to dose Vancomycin consult **AND** vancomycin - pharmacy to dose    Review of patient's allergies indicates:  No Known Allergies     Past Medical History:   Diagnosis Date    Hypertension 12/7/2020    Sleep apnea      History reviewed. No pertinent surgical history.    Family History     Problem Relation (Age of Onset)    Diabetes Mother, Father    Hypertension Mother, Father        Tobacco Use    Smoking status: Never Smoker   Substance and Sexual Activity    Alcohol use: Yes     Alcohol/week: 14.0 standard drinks     Types: 14 Shots of liquor per week    Drug use: Never    Sexual activity: Not on file     Review of Systems  Objective:     Vital Signs (Most Recent):  Temp: 98.4 °F (36.9 °C) (12/07/20 0439)  Pulse: 78 (12/07/20 0439)  Resp: 18 (12/07/20 0439)  BP: 136/76 (12/07/20 0439)  SpO2: 98 % (12/07/20 0439) Vital Signs (24h Range):  Temp:  [98.3 °F (36.8 °C)-98.6 °F (37 °C)] 98.4 °F (36.9 °C)  Pulse:  [78-92] 78  Resp:  [18-19] 18  SpO2:  [96 %-98 %] 98 %  BP: (128-155)/(72-81) 136/76     Weight: (!) 168.7 kg (371 lb 14.7 oz)  Body mass index is 47.75 kg/m².    Foot Exam    Laboratory:  CBC, ESR, CMP, C-reactive protein, all of the labs that were pertinent.  Recent Labs   Lab 12/07/20  0525   WBC 6.78   RBC 4.30*   HGB 12.5*   HCT 37.9*      MCV 88   MCH 29.1   MCHC 33.0     CMP:   Recent Labs   Lab 12/06/20  2252 12/07/20  0525   * 415*   CALCIUM 9.5 8.8   ALBUMIN 4.1  --    PROT 7.1  --    * 128*   K 4.4 4.1   CO2 24 28   CL 94* 94*   BUN 23* 22*   CREATININE 1.4 1.2   ALKPHOS 107  --    ALT 36  --    AST 22  --    BILITOT 0.8  --      CRP:   Recent Labs   Lab 12/06/20 2252   CRP 0.98*     ESR:   Recent Labs   Lab 12/06/20 2252   SEDRATE 9       Diagnostic Results:  X-Ray: I have reviewed all pertinent  results/findings within the past 24 hours.  The x-rays show previous foot surgery with an Akin osteotomy with screw fixation, bunionectomy, shortening osteotomy 2nd 3rd metatarsal head with screw fixation, arthroplasty PIPJ toes 2 and 3 on the left foot.  I see no obvious gas in the tissue no obvious bone destruction that is consistent with osteomyelitis.  There is no calcification been in the vascular still structures of the foot.    Clinical Findings:  There was moderate edema, pain, an ulceration and indurated at the dorsal and distal forefoot.              Assessment/Plan:     Active Diagnoses:    Diagnosis Date Noted POA    PRINCIPAL PROBLEM:  Hyperglycemia [R73.9] 12/07/2020 Yes    Hypertension [I10] 12/07/2020 Yes    Morbid obesity with BMI of 45.0-49.9, adult [E66.01, Z68.42] 12/07/2020 Not Applicable    Cellulitis [L03.90] 12/06/2020 Yes      Problems Resolved During this Admission:       Assessment:  Cellulitis versus postoperative edema left foot.  Uncontrolled diabetes mellitus.    Plan:  The exam today is not obvious at this is an abscess formation although there may be 1 area of potential early superficial formation of bulla.  There is a small ulcer at the plantar aspect of the 2nd toe did does not appear to be fluctuant I do not see any purulent drainage.  X-rays show that the surgery is going on heal normally.  Problem I run into is trying to decide how we can decide whether this is infection and anything that needs to be surgically approach since an MRI will be distorted by the metal and a 3 phase bone scan will be hot secondary to previous surgery.  I will discuss with Radiology with that think would be the best test to Jeremiah whether that would possibly be a CT scan.  If the area becomes fluctuance and come still head that up do incision drainage for cultures.  There is a possibility that with antibiotics and local wound care that this may go on to heal.  I will follow up with the patient once I  get more information as to whether I need to do any in incision and drainage.    Thank you for your consult. I will follow-up with patient. Please contact us if you have any additional questions.    Enrique Andrade DPM  Podiatry  [facility]

## 2020-12-08 LAB
ANION GAP SERPL CALC-SCNC: 8 MMOL/L (ref 8–16)
BASOPHILS # BLD AUTO: 0.02 K/UL (ref 0–0.2)
BASOPHILS NFR BLD: 0.4 % (ref 0–1.9)
BUN SERPL-MCNC: 20 MG/DL (ref 6–20)
CALCIUM SERPL-MCNC: 8.8 MG/DL (ref 8.7–10.5)
CHLORIDE SERPL-SCNC: 96 MMOL/L (ref 95–110)
CO2 SERPL-SCNC: 26 MMOL/L (ref 23–29)
CREAT SERPL-MCNC: 1.2 MG/DL (ref 0.5–1.4)
DIFFERENTIAL METHOD: ABNORMAL
EOSINOPHIL # BLD AUTO: 0.1 K/UL (ref 0–0.5)
EOSINOPHIL NFR BLD: 2.3 % (ref 0–8)
ERYTHROCYTE [DISTWIDTH] IN BLOOD BY AUTOMATED COUNT: 12.3 % (ref 11.5–14.5)
EST. GFR  (AFRICAN AMERICAN): >60 ML/MIN/1.73 M^2
EST. GFR  (NON AFRICAN AMERICAN): >60 ML/MIN/1.73 M^2
GLUCOSE SERPL-MCNC: 209 MG/DL (ref 70–110)
GLUCOSE SERPL-MCNC: 353 MG/DL (ref 70–110)
GLUCOSE SERPL-MCNC: 356 MG/DL (ref 70–110)
GLUCOSE SERPL-MCNC: 360 MG/DL (ref 70–110)
GLUCOSE SERPL-MCNC: 363 MG/DL (ref 70–110)
HCT VFR BLD AUTO: 38.6 % (ref 40–54)
HGB BLD-MCNC: 12.4 G/DL (ref 14–18)
IMM GRANULOCYTES # BLD AUTO: 0.04 K/UL (ref 0–0.04)
IMM GRANULOCYTES NFR BLD AUTO: 0.7 % (ref 0–0.5)
LYMPHOCYTES # BLD AUTO: 1.7 K/UL (ref 1–4.8)
LYMPHOCYTES NFR BLD: 29.3 % (ref 18–48)
MAGNESIUM SERPL-MCNC: 1.8 MG/DL (ref 1.6–2.6)
MCH RBC QN AUTO: 27.8 PG (ref 27–31)
MCHC RBC AUTO-ENTMCNC: 32.1 G/DL (ref 32–36)
MCV RBC AUTO: 87 FL (ref 82–98)
MONOCYTES # BLD AUTO: 0.6 K/UL (ref 0.3–1)
MONOCYTES NFR BLD: 11.2 % (ref 4–15)
NEUTROPHILS # BLD AUTO: 3.2 K/UL (ref 1.8–7.7)
NEUTROPHILS NFR BLD: 56.1 % (ref 38–73)
NRBC BLD-RTO: 0 /100 WBC
PLATELET # BLD AUTO: 189 K/UL (ref 150–350)
PMV BLD AUTO: 10.9 FL (ref 9.2–12.9)
POTASSIUM SERPL-SCNC: 4.4 MMOL/L (ref 3.5–5.1)
RBC # BLD AUTO: 4.46 M/UL (ref 4.6–6.2)
SODIUM SERPL-SCNC: 130 MMOL/L (ref 136–145)
VANCOMYCIN TROUGH SERPL-MCNC: 10.9 UG/ML (ref 10–22)
WBC # BLD AUTO: 5.63 K/UL (ref 3.9–12.7)

## 2020-12-08 PROCEDURE — 36415 COLL VENOUS BLD VENIPUNCTURE: CPT

## 2020-12-08 PROCEDURE — C9399 UNCLASSIFIED DRUGS OR BIOLOG: HCPCS | Performed by: INTERNAL MEDICINE

## 2020-12-08 PROCEDURE — 12000002 HC ACUTE/MED SURGE SEMI-PRIVATE ROOM

## 2020-12-08 PROCEDURE — 25000003 PHARM REV CODE 250: Performed by: NURSE PRACTITIONER

## 2020-12-08 PROCEDURE — 94761 N-INVAS EAR/PLS OXIMETRY MLT: CPT

## 2020-12-08 PROCEDURE — 63600175 PHARM REV CODE 636 W HCPCS: Performed by: INTERNAL MEDICINE

## 2020-12-08 PROCEDURE — 99900035 HC TECH TIME PER 15 MIN (STAT)

## 2020-12-08 PROCEDURE — 25000003 PHARM REV CODE 250: Performed by: INTERNAL MEDICINE

## 2020-12-08 PROCEDURE — 85025 COMPLETE CBC W/AUTO DIFF WBC: CPT

## 2020-12-08 PROCEDURE — 63600175 PHARM REV CODE 636 W HCPCS: Performed by: NURSE PRACTITIONER

## 2020-12-08 PROCEDURE — 80048 BASIC METABOLIC PNL TOTAL CA: CPT

## 2020-12-08 PROCEDURE — 80202 ASSAY OF VANCOMYCIN: CPT

## 2020-12-08 PROCEDURE — 83735 ASSAY OF MAGNESIUM: CPT

## 2020-12-08 PROCEDURE — 82962 GLUCOSE BLOOD TEST: CPT

## 2020-12-08 PROCEDURE — 99900031 HC PATIENT EDUCATION (STAT)

## 2020-12-08 RX ADMIN — PIPERACILLIN SODIUM AND TAZOBACTAM SODIUM 4.5 G: 4; .5 INJECTION, POWDER, LYOPHILIZED, FOR SOLUTION INTRAVENOUS at 07:12

## 2020-12-08 RX ADMIN — MAGNESIUM OXIDE 800 MG: 400 TABLET ORAL at 08:12

## 2020-12-08 RX ADMIN — INSULIN DETEMIR 20 UNITS: 100 INJECTION, SOLUTION SUBCUTANEOUS at 08:12

## 2020-12-08 RX ADMIN — INSULIN DETEMIR 15 UNITS: 100 INJECTION, SOLUTION SUBCUTANEOUS at 08:12

## 2020-12-08 RX ADMIN — INSULIN ASPART 10 UNITS: 100 INJECTION, SOLUTION INTRAVENOUS; SUBCUTANEOUS at 03:12

## 2020-12-08 RX ADMIN — VANCOMYCIN HYDROCHLORIDE 2000 MG: 500 INJECTION, POWDER, LYOPHILIZED, FOR SOLUTION INTRAVENOUS at 11:12

## 2020-12-08 RX ADMIN — ENOXAPARIN SODIUM 40 MG: 40 INJECTION SUBCUTANEOUS at 08:12

## 2020-12-08 RX ADMIN — INSULIN ASPART 10 UNITS: 100 INJECTION, SOLUTION INTRAVENOUS; SUBCUTANEOUS at 07:12

## 2020-12-08 RX ADMIN — PIPERACILLIN SODIUM AND TAZOBACTAM SODIUM 4.5 G: 4; .5 INJECTION, POWDER, LYOPHILIZED, FOR SOLUTION INTRAVENOUS at 03:12

## 2020-12-08 RX ADMIN — VANCOMYCIN HYDROCHLORIDE 2000 MG: 500 INJECTION, POWDER, LYOPHILIZED, FOR SOLUTION INTRAVENOUS at 02:12

## 2020-12-08 RX ADMIN — INSULIN ASPART 10 UNITS: 100 INJECTION, SOLUTION INTRAVENOUS; SUBCUTANEOUS at 11:12

## 2020-12-08 RX ADMIN — SODIUM CHLORIDE 100 ML/HR: 0.9 INJECTION, SOLUTION INTRAVENOUS at 08:12

## 2020-12-08 NOTE — PROGRESS NOTES
Identifying data:  55 year male    History of present illness is possible infection left foot ulcer left 2nd toe [facility]  He states his right foot also turned black like this in and resultant time and his left foot actually did looks pedis is right foot did 0.7 problem resolving.      Objective:  Examination is unchanged    Assessment:  1 cellulitis?  Left foot 2.  Osteomyelitis?  Left foot    Plan:  Never did hear back from Radiology as to whether a CT scan versus a white blood cell take 3 phase bone scan would be better.  Today in of water a CT scan of the left foot which will have less distortion and MRI see if there is any way thickened tell me if there is any deep infection or bone infection.  Once again I will recheck the Radiology to see if they think a white blood cell tagged 3 phase bone scan would also be of benefit.  Was a gets results of CT scan and make a decision as to whether any further surgical procedures are indicated.  I will follow with the patient. Wound care will continue local wound care.

## 2020-12-08 NOTE — PROGRESS NOTES
VANCOMYCIN PHARMACOKINETIC NOTE:  Vancomycin Day #3    Objective:    55 y.o., male, Actual Body Weight = (!) 168.7 kg (371 lb 14.7 oz)    Diagnosis/Indication for Vancomycin:  Cellulitis   Desired Vancomycin Peak:  30-35 mcg/ml; Desired Trough: 10-15 mcg/ml    Diagnosis/Indication for Vancomycin:  Osteomyelitis   Desired Vancomycin Peak:  35-40 mcg/ml; Desired Trough: 15-20 mcg/ml    Current Vancomycin Regimen:  2000 mg IV every 12 hours    Antibiotics (From admission, onward)      Start     Stop Route Frequency Ordered    12/08/20 2200  vancomycin (VANCOCIN) 2,000 mg in dextrose 5 % 500 mL IVPB      -- IV Every 8 hours 12/08/20 1656    12/07/20 0800  piperacillin-tazobactam 4.5 g in dextrose 5 % 100 mL IVPB (ready to mix system)      -- IV Every 8 hours (non-standard times) 12/07/20 0013    12/06/20 2336  vancomycin - pharmacy to dose  (vancomycin IVPB)      -- IV pharmacy to manage frequency 12/06/20 2236             The patient has the following labs:     12/8/2020 Estimated Creatinine Clearance: 113.6 mL/min (based on SCr of 1.2 mg/dL). Lab Results   Component Value Date    BUN 20 12/08/2020     Lab Results   Component Value Date    WBC 5.63 12/08/2020        Vancomycin Trough:  10.9 mcg/mL collected 11 hours after Dose #3 (drawn correctly).    Microbiology Results (last 7 days)       Procedure Component Value Units Date/Time    Blood culture x two cultures. Draw prior to antibiotics. [[id number]] Collected: 12/06/20 2247    Order Status: Completed Specimen: Blood from Peripheral, Hand, Left Updated: 12/08/20 0232     Blood Culture, Routine No Growth to date      No Growth to date    Narrative:      Aerobic and anaerobic    Blood culture x two cultures. Draw prior to antibiotics. [[id number]] Collected: 12/06/20 2247    Order Status: Completed Specimen: Blood from Peripheral, Wrist, Right Updated: 12/08/20 0232     Blood Culture, Routine No Growth to date      No Growth to date    Narrative:      Aerobic and  anaerobic           Assessment:  Renal function is: stable  Vancomycin trough is below goal range.    Vancomycin Pharmacokinetic Parameters  Elimination rate constant (ke) - 0.085 hr-1  Elimination half-life (t½) = 8.15 hours    Plan:  Will change vancomycin to 2000 mg IV every 8 hours.   Will obtain vancomycin trough after 3 more dose(s), prior to dose due 12/9/20 at 13:00    Pharmacy will continue to manage vancomycin therapy and adjust regimen as necessary.    Thank you for allowing us to participate in this patient's care.     Darien Syed 12/8/2020 5:00 PM  Department of Pharmacy  Ext [phone]

## 2020-12-08 NOTE — PROGRESS NOTES
[facility] Medicine  Progress Note    Patient name: Omi Julian  MRN: [id number]  Admit Date: 12/6/2020   LOS: 2 days     SUBJECTIVE:     Principal problem: Diabetic wet gangrene of the foot    Interval History:    No new c/o  Pending CT results  oncreased levemir  Will f/u podiatry    Scheduled Meds:   enoxparin  40 mg Subcutaneous Q12H    insulin detemir U-100  20 Units Subcutaneous Daily    piperacillin-tazobactam (ZOSYN) IVPB  4.5 g Intravenous Q8H    vancomycin (VANCOCIN) IVPB  2,000 mg Intravenous Q12H     Continuous Infusions:   sodium chloride 0.9% 100 mL/hr at 12/07/20 1657     PRN Meds:acetaminophen, acetaminophen, calcium chloride IVPB, calcium chloride IVPB, calcium chloride IVPB, dextrose 50%, dextrose 50%, glucagon (human recombinant), glucose, glucose, HYDROcodone-acetaminophen, insulin aspart U-100, magnesium oxide, magnesium sulfate IVPB, magnesium sulfate IVPB, magnesium sulfate IVPB, magnesium sulfate IVPB, ondansetron, potassium chloride in water, potassium chloride in water, potassium chloride in water, potassium chloride in water, potassium chloride, potassium chloride, potassium chloride, potassium chloride, promethazine (PHENERGAN) IVPB, sodium chloride 0.9%, sodium phosphate IVPB, sodium phosphate IVPB, sodium phosphate IVPB, sodium phosphate IVPB, sodium phosphate IVPB, Pharmacy to dose Vancomycin consult **AND** vancomycin - pharmacy to dose    Review of patient's allergies indicates:  No Known Allergies    Review of Systems: As per interval history    OBJECTIVE:     Vital Signs (Most Recent)  Temp: 98.7 °F (37.1 °C) (12/08/20 0724)  Pulse: 67 (12/08/20 0724)  Resp: 18 (12/08/20 0724)  BP: 133/84 (12/08/20 0724)  SpO2: 99 % (12/08/20 0724)    Vital Signs Range (Last 24H):  Temp:  [97.8 °F (36.6 °C)-98.9 °F (37.2 °C)]   Pulse:  []   Resp:  [16-18]   BP: (108-133)/(62-90)   SpO2:  [96 %-99 %]     I & O (Last 24H):    Intake/Output Summary (Last 24  hours) at 12/8/2020 0858  Last data filed at 12/8/2020 0841  Gross per 24 hour   Intake --   Output 1725 ml   Net -1725 ml       Physical Exam:  General: Patient resting comfortably in no acute distress. Appears as stated age. Calm  Eyes: No conjunctival injection. No scleral icterus.  ENT: Hearing grossly intact. No discharge from ears. No nasal discharge.   Neck: Supple, trachea midline. No JVD  CVS: RRR. No LE edema BL  Lungs: CTA BL, no wheezing or crackles. Good breath sounds. No accessory muscle use. No acute respiratory distress  Abdomen:  Soft, nontender and nondistended.  No organomegaly  Neuro: AOx3. Moves all extremities. Follows commands. Responds appropriately   Skin:  No rash or erythema noted  +edema, ulceration, induration of L foot  Laboratory:  CBC:   Recent Labs   Lab 12/08/20  0643   WBC 5.63   RBC 4.46*   HGB 12.4*   HCT 38.6*      MCV 87   MCH 27.8   MCHC 32.1     BMP:   Recent Labs   Lab 12/08/20  0643   *   *   K 4.4   CL 96   CO2 26   BUN 20   CREATININE 1.2   CALCIUM 8.8   MG 1.8           ASSESSMENT/PLAN:     Diabetic Foot Ulcer with Gangrene   Suspected osteomyelitis  Pt with recent surgery and metal implant  - podiatry discussing with radiology re: best imaging technique  - vanc, zosyn  - possible repeat debridement per podiatry     DM2  a1c 9.5  - pt reported that he is not diabetic  - we discussed this in detail  - diabetic education   - SSI  - increased basal  - adjusting insulin prn     Other chronic conditions:  Home meds as appropriate  Electrolyte derangement:  Trending BMP, Mg; Replacement prn  DVT ppx:  lovenox  FULL CODE    VTE Risk Mitigation (From admission, onward)         Ordered     enoxaparin injection 40 mg  Every 12 hours      12/07/20 0017     IP VTE HIGH RISK PATIENT  Once      12/07/20 0017                  Department Hospital Medicine  [facility]  Maritza Fox MD  Date of service: 12/08/2020

## 2020-12-08 NOTE — PROGRESS NOTES
- Injection R arm IV Ind-lll labelled WBC's for Inflammatory Localization scan  At 14:20.    -Patient will be scanned to delay images  Tomorrow 12/09/20    DARREN [facility]anco [facility]

## 2020-12-08 NOTE — RESPIRATORY THERAPY
12/08/20 0112   Patient Assessment/Suction   Level of Consciousness (AVPU) alert   Respiratory Effort Unlabored   Expansion/Accessory Muscles/Retractions expansion symmetric;no retractions;no use of accessory muscles   All Lung Fields Breath Sounds diminished;clear   Rhythm/Pattern, Respiratory no shortness of breath reported;pattern regular;unlabored   PRE-TX-O2   O2 Device (Oxygen Therapy) room air   Pulse Oximetry Type Intermittent   $ Pulse Oximetry - Multiple Charge Pulse Oximetry - Multiple   Respiratory Interventions   Cough And Deep Breathing done with encouragement   Breathing Techniques/Airway Clearance deep/controlled cough encouraged;diaphragmatic breathing promoted   Education   $ Education Other (see comment);15 min  (resp assessment,deep diaphragmatic breathing exercises)   Respiratory Evaluation   $ Care Plan Tech Time 15 min   Evaluation For   (care plan)        12/08/20 0112   Patient Assessment/Suction   Level of Consciousness (AVPU) alert   Respiratory Effort Unlabored   Expansion/Accessory Muscles/Retractions expansion symmetric;no retractions;no use of accessory muscles   All Lung Fields Breath Sounds diminished;clear   Rhythm/Pattern, Respiratory no shortness of breath reported;pattern regular;unlabored   PRE-TX-O2   O2 Device (Oxygen Therapy) room air   Pulse Oximetry Type Intermittent   $ Pulse Oximetry - Multiple Charge Pulse Oximetry - Multiple   Respiratory Interventions   Cough And Deep Breathing done with encouragement   Breathing Techniques/Airway Clearance deep/controlled cough encouraged;diaphragmatic breathing promoted   Education   $ Education Other (see comment);15 min  (resp assessment,deep diaphragmatic breathing exercises)   Respiratory Evaluation   $ Care Plan Tech Time 15 min   Evaluation For   (care plan)

## 2020-12-08 NOTE — PROGRESS NOTES
60 ml's Whole Blood drawn from  R arm with the assistance of SOPHIA Lamar at 11:40 am 12/08/20.     - [facility] picked up Blood product @ 11:55am    -Processed WBC's will be tagged and administered after delivery.    - 24 hr delay w/ be scanned tomorrow 12/09/20    JULIA. [facility]

## 2020-12-09 LAB
ANION GAP SERPL CALC-SCNC: 8 MMOL/L (ref 8–16)
BASOPHILS # BLD AUTO: 0.03 K/UL (ref 0–0.2)
BASOPHILS NFR BLD: 0.4 % (ref 0–1.9)
BUN SERPL-MCNC: 19 MG/DL (ref 6–20)
CALCIUM SERPL-MCNC: 9 MG/DL (ref 8.7–10.5)
CHLORIDE SERPL-SCNC: 97 MMOL/L (ref 95–110)
CO2 SERPL-SCNC: 25 MMOL/L (ref 23–29)
CREAT SERPL-MCNC: 1.1 MG/DL (ref 0.5–1.4)
DIFFERENTIAL METHOD: ABNORMAL
EOSINOPHIL # BLD AUTO: 0.1 K/UL (ref 0–0.5)
EOSINOPHIL NFR BLD: 1.8 % (ref 0–8)
ERYTHROCYTE [DISTWIDTH] IN BLOOD BY AUTOMATED COUNT: 12.7 % (ref 11.5–14.5)
EST. GFR  (AFRICAN AMERICAN): >60 ML/MIN/1.73 M^2
EST. GFR  (NON AFRICAN AMERICAN): >60 ML/MIN/1.73 M^2
GLUCOSE SERPL-MCNC: 163 MG/DL (ref 70–110)
GLUCOSE SERPL-MCNC: 295 MG/DL (ref 70–110)
GLUCOSE SERPL-MCNC: 303 MG/DL (ref 70–110)
GLUCOSE SERPL-MCNC: 315 MG/DL (ref 70–110)
GLUCOSE SERPL-MCNC: 321 MG/DL (ref 70–110)
HCT VFR BLD AUTO: 40.6 % (ref 40–54)
HGB BLD-MCNC: 13.4 G/DL (ref 14–18)
IMM GRANULOCYTES # BLD AUTO: 0.07 K/UL (ref 0–0.04)
IMM GRANULOCYTES NFR BLD AUTO: 1 % (ref 0–0.5)
LYMPHOCYTES # BLD AUTO: 1.9 K/UL (ref 1–4.8)
LYMPHOCYTES NFR BLD: 28.3 % (ref 18–48)
MAGNESIUM SERPL-MCNC: 2 MG/DL (ref 1.6–2.6)
MCH RBC QN AUTO: 29 PG (ref 27–31)
MCHC RBC AUTO-ENTMCNC: 33 G/DL (ref 32–36)
MCV RBC AUTO: 88 FL (ref 82–98)
MONOCYTES # BLD AUTO: 0.6 K/UL (ref 0.3–1)
MONOCYTES NFR BLD: 8.7 % (ref 4–15)
NEUTROPHILS # BLD AUTO: 4.1 K/UL (ref 1.8–7.7)
NEUTROPHILS NFR BLD: 59.8 % (ref 38–73)
NRBC BLD-RTO: 0 /100 WBC
PLATELET # BLD AUTO: 212 K/UL (ref 150–350)
PMV BLD AUTO: 10.6 FL (ref 9.2–12.9)
POTASSIUM SERPL-SCNC: 4.3 MMOL/L (ref 3.5–5.1)
RBC # BLD AUTO: 4.62 M/UL (ref 4.6–6.2)
SODIUM SERPL-SCNC: 130 MMOL/L (ref 136–145)
VANCOMYCIN TROUGH SERPL-MCNC: 15.8 UG/ML (ref 10–22)
WBC # BLD AUTO: 6.79 K/UL (ref 3.9–12.7)

## 2020-12-09 PROCEDURE — 12000002 HC ACUTE/MED SURGE SEMI-PRIVATE ROOM

## 2020-12-09 PROCEDURE — 82962 GLUCOSE BLOOD TEST: CPT

## 2020-12-09 PROCEDURE — 63600175 PHARM REV CODE 636 W HCPCS: Performed by: NURSE PRACTITIONER

## 2020-12-09 PROCEDURE — 25000003 PHARM REV CODE 250: Performed by: INTERNAL MEDICINE

## 2020-12-09 PROCEDURE — 36415 COLL VENOUS BLD VENIPUNCTURE: CPT

## 2020-12-09 PROCEDURE — 94761 N-INVAS EAR/PLS OXIMETRY MLT: CPT

## 2020-12-09 PROCEDURE — 85025 COMPLETE CBC W/AUTO DIFF WBC: CPT

## 2020-12-09 PROCEDURE — 80202 ASSAY OF VANCOMYCIN: CPT

## 2020-12-09 PROCEDURE — 83735 ASSAY OF MAGNESIUM: CPT

## 2020-12-09 PROCEDURE — 63600175 PHARM REV CODE 636 W HCPCS: Performed by: INTERNAL MEDICINE

## 2020-12-09 PROCEDURE — 80048 BASIC METABOLIC PNL TOTAL CA: CPT

## 2020-12-09 RX ADMIN — ENOXAPARIN SODIUM 40 MG: 40 INJECTION SUBCUTANEOUS at 08:12

## 2020-12-09 RX ADMIN — ENOXAPARIN SODIUM 40 MG: 40 INJECTION SUBCUTANEOUS at 09:12

## 2020-12-09 RX ADMIN — HUMAN INSULIN 8 UNITS: 100 INJECTION, SOLUTION SUBCUTANEOUS at 08:12

## 2020-12-09 RX ADMIN — HUMAN INSULIN 9 UNITS: 100 INJECTION, SOLUTION SUBCUTANEOUS at 03:12

## 2020-12-09 RX ADMIN — HUMAN INSULIN 8 UNITS: 100 INJECTION, SOLUTION SUBCUTANEOUS at 11:12

## 2020-12-09 RX ADMIN — VANCOMYCIN HYDROCHLORIDE 2000 MG: 1 INJECTION, POWDER, LYOPHILIZED, FOR SOLUTION INTRAVENOUS at 10:12

## 2020-12-09 RX ADMIN — PIPERACILLIN SODIUM AND TAZOBACTAM SODIUM 4.5 G: 4; .5 INJECTION, POWDER, LYOPHILIZED, FOR SOLUTION INTRAVENOUS at 05:12

## 2020-12-09 RX ADMIN — VANCOMYCIN HYDROCHLORIDE 2000 MG: 500 INJECTION, POWDER, LYOPHILIZED, FOR SOLUTION INTRAVENOUS at 05:12

## 2020-12-09 RX ADMIN — VANCOMYCIN HYDROCHLORIDE 2000 MG: 1 INJECTION, POWDER, LYOPHILIZED, FOR SOLUTION INTRAVENOUS at 03:12

## 2020-12-09 RX ADMIN — PIPERACILLIN SODIUM AND TAZOBACTAM SODIUM 4.5 G: 4; .5 INJECTION, POWDER, LYOPHILIZED, FOR SOLUTION INTRAVENOUS at 01:12

## 2020-12-09 RX ADMIN — PIPERACILLIN SODIUM AND TAZOBACTAM SODIUM 4.5 G: 4; .5 INJECTION, POWDER, LYOPHILIZED, FOR SOLUTION INTRAVENOUS at 10:12

## 2020-12-09 NOTE — PLAN OF CARE
Problem: Adult Inpatient Plan of Care  Goal: Plan of Care Review  Outcome: Ongoing, Progressing  Goal: Patient-Specific Goal (Individualization)  Outcome: Ongoing, Progressing  Goal: Absence of Hospital-Acquired Illness or Injury  Outcome: Ongoing, Progressing  Goal: Optimal Comfort and Wellbeing  Outcome: Ongoing, Progressing  Goal: Readiness for Transition of Care  Outcome: Ongoing, Progressing  Goal: Rounds/Family Conference  Outcome: Ongoing, Progressing     Problem: Bariatric Environmental Safety  Goal: Safety Maintained with Care  Outcome: Ongoing, Progressing     Problem: Fall Injury Risk  Goal: Absence of Fall and Fall-Related Injury  Outcome: Ongoing, Progressing     Problem: Skin Injury Risk Increased  Goal: Skin Health and Integrity  Outcome: Ongoing, Progressing     Problem: Diabetes Comorbidity  Goal: Blood Glucose Level Within Desired Range  Outcome: Ongoing, Progressing     Problem: Wound  Goal: Optimal Wound Healing  Outcome: Ongoing, Progressing

## 2020-12-09 NOTE — PROGRESS NOTES
-Delay images in progress at 14:45 pm for Limited bone marrow study NM    - Images will be complete at 15:00.    DARREN [facility]

## 2020-12-09 NOTE — PROGRESS NOTES
@ 13:48  Patient injected with Tc99m Sulfur Colloid LT Arm IV for delayed Bone Marrow Scan.    No restrictions from Nuclear Medicine at this time.  Patient will be imaged min. 1HR Post Injection.

## 2020-12-09 NOTE — NURSING
Pt went to do a procedure this is why I did not have the results on time to give vanc at the scheduled time.  Then, I called pharmacy to see if we can change his vanc from D5 to NS.  They said that they are sending it.

## 2020-12-09 NOTE — PROGRESS NOTES
Pharmacokinetic Assessment Follow Up: IV Vancomycin    Vancomycin serum concentration assessment(s):    The trough level was drawn correctly and can be used to guide therapy at this time. The measurement is within the desired definitive target range of 15 to 20 mcg/mL.    Vancomycin Regimen Plan:    Continue regimen to Vancomycin 2000 mg IV every 8 hours with next serum trough concentration measured at 13:00 prior to 10th dose on 12/10    Drug levels (last 3 results):  Recent Labs   Lab Result Units 12/08/20  1316 12/09/20  1417   Vancomycin-Trough ug/mL 10.9 15.8       Pharmacy will continue to follow and monitor vancomycin.    Please contact pharmacy at extension [phone] for questions regarding this assessment.    Thank you for the consult,   Sarah Carrillo       Patient brief summary:  Omi Julian is a 55 y.o. male initiated on antimicrobial therapy with IV Vancomycin for treatment of  Osteomyelitis    Drug Allergies:   Review of patient's allergies indicates:  No Known Allergies    Actual Body Weight:   168.7 kg    Renal Function:   Estimated Creatinine Clearance: 123.9 mL/min (based on SCr of 1.1 mg/dL).,     Dialysis Method (if applicable):  N/A    CBC (last 72 hours):  Recent Labs   Lab Result Units 12/06/20  2252 12/07/20  0525 12/08/20  0643 12/09/20  0645   WBC K/uL 7.93 6.78 5.63 6.79   Hemoglobin g/dL 13.0* 12.5* 12.4* 13.4*   Hemoglobin A1C % 9.5*  --   --   --    Hematocrit % 39.6* 37.9* 38.6* 40.6   Platelets K/uL 208 192 189 212   Gran % % 57.2 57.1 56.1 59.8   Lymph % % 30.1 29.2 29.3 28.3   Mono % % 10.2 10.6 11.2 8.7   Eosinophil % % 1.5 1.9 2.3 1.8   Basophil % % 0.4 0.3 0.4 0.4   Differential Method  Automated Automated Automated Automated       Metabolic Panel (last 72 hours):  Recent Labs   Lab Result Units 12/06/20  2252 12/06/20  2316 12/07/20  0525 12/08/20  0643 12/09/20  0645   Sodium mmol/L 128*  --  128* 130* 130*   Potassium mmol/L 4.4  --  4.1 4.4 4.3   Chloride mmol/L 94*  --  94* 96  97   CO2 mmol/L 24  --  28 26 25   Glucose mg/dL 539*  --  415* 356* 321*   Glucose, UA   --  4+*  --   --   --    BUN mg/dL 23*  --  22* 20 19   Creatinine mg/dL 1.4  --  1.2 1.2 1.1   Albumin g/dL 4.1  --   --   --   --    Total Bilirubin mg/dL 0.8  --   --   --   --    Alkaline Phosphatase U/L 107  --   --   --   --    AST U/L 22  --   --   --   --    ALT U/L 36  --   --   --   --    Magnesium mg/dL  --   --  1.8 1.8 2.0       Vancomycin Administrations:  vancomycin given in the last 96 hours                     vancomycin (VANCOCIN) 2,000 mg in dextrose 5 % 500 mL IVPB (mg) 2,000 mg New Bag 12/09/20 0549     2,000 mg New Bag 12/08/20 2300    vancomycin (VANCOCIN) 2,000 mg in dextrose 5 % 500 mL IVPB (mg) 2,000 mg New Bag 12/08/20 1419     2,000 mg New Bag  0202     2,000 mg New Bag 12/07/20 1251    vancomycin in dextrose 5 % 1 gram/250 mL IVPB 2,000 mg (mg) 2,000 mg New Bag 12/06/20 2334                    Microbiologic Results:  Microbiology Results (last 7 days)       Procedure Component Value Units Date/Time    Blood culture x two cultures. Draw prior to antibiotics. [[id number]] Collected: 12/06/20 2247    Order Status: Completed Specimen: Blood from Peripheral, Wrist, Right Updated: 12/09/20 0232     Blood Culture, Routine No Growth to date      No Growth to date      No Growth to date    Narrative:      Aerobic and anaerobic    Blood culture x two cultures. Draw prior to antibiotics. [[id number]] Collected: 12/06/20 2247    Order Status: Completed Specimen: Blood from Peripheral, Hand, Left Updated: 12/09/20 0232     Blood Culture, Routine No Growth to date      No Growth to date      No Growth to date    Narrative:      Aerobic and anaerobic

## 2020-12-09 NOTE — CARE UPDATE
12/09/20 1300   PRE-TX-O2   O2 Device (Oxygen Therapy) room air   SpO2 97 %   Pulse Oximetry Type Intermittent   $ Pulse Oximetry - Multiple Charge Pulse Oximetry - Multiple

## 2020-12-09 NOTE — CONSULTS
"[facility]  Adult Nutrition   Consult Note (Nutrition Education)     SUMMARY     Recommendations  Recommendation/Intervention: 1. Continue present diet as tolerated by patient 2.  to assist with meal choices daily 3. Xander BID to aid wound healing   Goals: 1. Patient to meet at least 75% of estimated needs through diet intake 2. Pt to voice understanding of diet instruction upon discharge  Nutrition Goal Status: new    Dietitian Rounds Brief    Consult noted for diet education. Noted newly dx DM. L foot ulcer noted. Will add Xander BID to aid in healing.      Education:    Diet education provided on DM diet. Discussed my plate, portion sizes, and timing of meals. Pt aware of CHO choices and limiting portions. Pt voiced understanding. Seems willing to make necessary changes in diet.     Reason for Assessment  Reason For Assessment: consult  Diagnosis: diabetes diagnosis/complications  Relevant Medical History: HTN, SELMA  Interdisciplinary Rounds: attended    Nutrition Risk Screen  Nutrition Risk Screen: no indicators present     MST Score: 0  Have you recently lost weight without trying?: No  Weight loss score: 0  Have you been eating poorly because of a decreased appetite?: No  Appetite score: 0       Nutrition/Diet History  Patient Reported Diet/Restrictions/Preferences: general  Food Allergies: NKFA  Factors Affecting Nutritional Intake: None identified at this time    Anthropometrics  Temp: 98.2 °F (36.8 °C)  Height Method: Stated  Height: 6' 2" (188 cm)  Height (inches): 74 in  Weight Method: Bed Scale  Weight: (!) 168.7 kg (371 lb 14.7 oz)  Weight (lb): (!) 371.92 lb  Ideal Body Weight (IBW), Male: 190 lb  % Ideal Body Weight, Male (lb): 195.75 %  BMI (Calculated): 47.7  BMI Grade: greater than 40 - morbid obesity       Weight History:  Wt Readings from Last 10 Encounters:   12/09/20 (!) 168.7 kg (371 lb 14.7 oz)   07/07/20 (!) 149.7 kg (330 lb)       Lab/Procedures/Meds: Pertinent Labs " Reviewed    Clinical Chemistry:  Recent Labs   Lab 12/06/20 2252 12/09/20 0645   *   < > 130*   K 4.4   < > 4.3   CL 94*   < > 97   CO2 24   < > 25   *   < > 321*   BUN 23*   < > 19   CREATININE 1.4   < > 1.1   CALCIUM 9.5   < > 9.0   PROT 7.1  --   --    ALBUMIN 4.1  --   --    BILITOT 0.8  --   --    ALKPHOS 107  --   --    AST 22  --   --    ALT 36  --   --    ANIONGAP 10   < > 8   ESTGFRAFRICA >60.0   < > >60.0   EGFRNONAA 55.8*   < > >60.0   MG  --    < > 2.0    < > = values in this interval not displayed.       CBC:   Recent Labs   Lab 12/09/20 0645   WBC 6.79   RBC 4.62   HGB 13.4*   HCT 40.6      MCV 88   MCH 29.0   MCHC 33.0       Cardiac Profile:  Recent Labs   Lab 12/06/20 2252   BNP 8       Inflammatory Labs:  Recent Labs   Lab 12/06/20 2252   CRP 0.98*       Diabetes:  Recent Labs   Lab 12/06/20 2252   HGBA1C 9.5*         Medications: Pertinent Medications reviewed    Scheduled Meds:   enoxparin  40 mg Subcutaneous Q12H    insulin detemir U-100  20 Units Subcutaneous BID    piperacillin-tazobactam (ZOSYN) IVPB  4.5 g Intravenous Q8H    vancomycin (VANCOCIN) IVPB  2,000 mg Intravenous Q8H       Continuous Infusions:   sodium chloride 0.9% 100 mL/hr (12/08/20 2056)       PRN Meds:.acetaminophen, acetaminophen, calcium chloride IVPB, calcium chloride IVPB, calcium chloride IVPB, HYDROcodone-acetaminophen, insulin regular, magnesium oxide, magnesium sulfate IVPB, magnesium sulfate IVPB, magnesium sulfate IVPB, magnesium sulfate IVPB, ondansetron, potassium chloride in water, potassium chloride in water, potassium chloride in water, potassium chloride in water, potassium chloride, potassium chloride, potassium chloride, potassium chloride, promethazine (PHENERGAN) IVPB, sodium chloride 0.9%, sodium phosphate IVPB, sodium phosphate IVPB, sodium phosphate IVPB, sodium phosphate IVPB, sodium phosphate IVPB, Pharmacy to dose Vancomycin consult **AND** vancomycin - pharmacy to  dose    Estimated/Assessed Needs  Weight Used For Calorie Calculations: (!) 168.7 kg (371 lb 14.7 oz)  Energy Calorie Requirements (kcal): 2535 (15 kcal/kg)  Energy Need Method: Kcal/kg  Protein Requirements: 129 g (1.5 g/kg) per IBW  Weight Used For Protein Calculations: 86 kg (189 lb 9.5 oz)(IBW)     Estimated Fluid Requirement Method: RDA Method  RDA Method (mL): 2535       Nutrition Prescription Ordered  Current Diet Order: 2200 ADA  Nutrition Order Comments: only one CHO per meal    Evaluation of Received Nutrient/Fluid Intake  Energy Calories Required: meeting needs  Protein Required: meeting needs  Fluid Required: meeting needs  Tolerance: tolerating     Intake/Output Summary (Last 24 hours) at 12/9/2020 1256  Last data filed at 12/9/2020 0500  Gross per 24 hour   Intake --   Output 850 ml   Net -850 ml        % Meal Intake: 75 - 100 %    Nutrition Risk  Level of Risk/Frequency of Follow-up: moderate     Monitor and Evaluation  Food and Nutrient Intake: energy intake, food and beverage intake  Food and Nutrient Adminstration: diet order  Knowledge/Beliefs/Attitudes: beliefs and attitudes, food and nutrition knowledge/skill  Physical Activity and Function: nutrition-related ADLs and IADLs  Anthropometric Measurements: weight change, weight, body mass index  Biochemical Data, Medical Tests and Procedures: gastrointestinal profile, electrolyte and renal panel, glucose/endocrine profile, inflammatory profile  Nutrition-Focused Physical Findings: overall appearance     Nutrition Follow-Up  RD Follow-up?: Yes      Mar Diaz RD 12/09/2020 12:56 PM

## 2020-12-09 NOTE — PLAN OF CARE
Problem: Wound  Goal: Optimal Wound Healing  Outcome: Ongoing, Progressing  Intervention: Promote Effective Wound Healing  Flowsheets (Taken 12/9/2020 1328)  Oral Nutrition Promotion: (Xander)   calorie dense liquids provided   other (see comments)

## 2020-12-09 NOTE — PROGRESS NOTES
[facility] Medicine  Progress Note    Patient name: Omi Julian  MRN: [id number]  Admit Date: 12/6/2020   LOS: 3 days     SUBJECTIVE:     Principal problem: Diabetic wet gangrene of the foot    Interval History:    No new c/o   CT left foot not concerning of osteomyelitis   increased levemir  F/u bone scan results  Will f/u podiatry    Scheduled Meds:   enoxparin  40 mg Subcutaneous Q12H    insulin detemir U-100  20 Units Subcutaneous BID    piperacillin-tazobactam (ZOSYN) IVPB  4.5 g Intravenous Q8H    vancomycin (VANCOCIN) IVPB  2,000 mg Intravenous Q8H     Continuous Infusions:   sodium chloride 0.9% 100 mL/hr (12/08/20 2056)     PRN Meds:acetaminophen, acetaminophen, calcium chloride IVPB, calcium chloride IVPB, calcium chloride IVPB, HYDROcodone-acetaminophen, insulin regular, magnesium oxide, magnesium sulfate IVPB, magnesium sulfate IVPB, magnesium sulfate IVPB, magnesium sulfate IVPB, ondansetron, potassium chloride in water, potassium chloride in water, potassium chloride in water, potassium chloride in water, potassium chloride, potassium chloride, potassium chloride, potassium chloride, promethazine (PHENERGAN) IVPB, sodium chloride 0.9%, sodium phosphate IVPB, sodium phosphate IVPB, sodium phosphate IVPB, sodium phosphate IVPB, sodium phosphate IVPB, Pharmacy to dose Vancomycin consult **AND** vancomycin - pharmacy to dose    Review of patient's allergies indicates:  No Known Allergies    Review of Systems: As per interval history    OBJECTIVE:     Vital Signs (Most Recent)  Temp: 98.2 °F (36.8 °C) (12/09/20 1158)  Pulse: 71 (12/09/20 1158)  Resp: 18 (12/09/20 1158)  BP: 122/77 (12/09/20 1158)  SpO2: 97 % (12/09/20 1300)    Vital Signs Range (Last 24H):  Temp:  [97.4 °F (36.3 °C)-98.7 °F (37.1 °C)]   Pulse:  [71-83]   Resp:  [16-18]   BP: ()/(59-77)   SpO2:  [97 %-100 %]     I & O (Last 24H):    Intake/Output Summary (Last 24 hours) at 12/9/2020 1524  Last data  filed at 12/9/2020 0500  Gross per 24 hour   Intake --   Output 850 ml   Net -850 ml       Physical Exam:  General: Patient resting comfortably in no acute distress. Appears as stated age. Calm  Eyes: No conjunctival injection. No scleral icterus.  ENT: Hearing grossly intact. No discharge from ears. No nasal discharge.   Neck: Supple, trachea midline. No JVD  CVS: RRR. No LE edema BL  Lungs: CTA BL, no wheezing or crackles. Good breath sounds. No accessory muscle use. No acute respiratory distress  Abdomen:  Soft, nontender and nondistended.  No organomegaly  Neuro: AOx3. Moves all extremities. Follows commands. Responds appropriately   Skin:  No rash or erythema noted  +edema, ulceration, induration of L foot  Laboratory:  CBC:   Recent Labs   Lab 12/09/20  0645   WBC 6.79   RBC 4.62   HGB 13.4*   HCT 40.6      MCV 88   MCH 29.0   MCHC 33.0     BMP:   Recent Labs   Lab 12/09/20  0645   *   *   K 4.3   CL 97   CO2 25   BUN 19   CREATININE 1.1   CALCIUM 9.0   MG 2.0           ASSESSMENT/PLAN:     Diabetic Foot Ulcer with Gangrene   Suspected osteomyelitis  Pt with recent surgery and metal implant  - podiatry discussing with radiology re: best imaging technique  - vanc, zosyn  - possible repeat debridement per podiatry     DM2  a1c 9.5  - pt reported that he is not diabetic  - we discussed this in detail  - diabetic education   - SSI  - increased basal  - adjusting insulin prn     Other chronic conditions:  Home meds as appropriate  Electrolyte derangement:  Trending BMP, Mg; Replacement prn  DVT ppx:  lovenox  FULL CODE    VTE Risk Mitigation (From admission, onward)         Ordered     enoxaparin injection 40 mg  Every 12 hours      12/07/20 0017     IP VTE HIGH RISK PATIENT  Once      12/07/20 0017                  Department Hospital Medicine  [facility]  Maritza Fox MD  Date of service: 12/09/2020

## 2020-12-10 VITALS
HEIGHT: 74 IN | TEMPERATURE: 98 F | OXYGEN SATURATION: 97 % | BODY MASS INDEX: 40.43 KG/M2 | WEIGHT: 315 LBS | SYSTOLIC BLOOD PRESSURE: 121 MMHG | DIASTOLIC BLOOD PRESSURE: 77 MMHG | HEART RATE: 78 BPM | RESPIRATION RATE: 20 BRPM

## 2020-12-10 PROBLEM — T81.49XA WOUND CELLULITIS AFTER SURGERY: Status: ACTIVE | Noted: 2020-12-07

## 2020-12-10 LAB
ANION GAP SERPL CALC-SCNC: 7 MMOL/L (ref 8–16)
BASOPHILS # BLD AUTO: 0.02 K/UL (ref 0–0.2)
BASOPHILS NFR BLD: 0.3 % (ref 0–1.9)
BUN SERPL-MCNC: 15 MG/DL (ref 6–20)
CALCIUM SERPL-MCNC: 9.1 MG/DL (ref 8.7–10.5)
CHLORIDE SERPL-SCNC: 100 MMOL/L (ref 95–110)
CO2 SERPL-SCNC: 25 MMOL/L (ref 23–29)
CREAT SERPL-MCNC: 1.1 MG/DL (ref 0.5–1.4)
DIFFERENTIAL METHOD: ABNORMAL
EOSINOPHIL # BLD AUTO: 0.1 K/UL (ref 0–0.5)
EOSINOPHIL NFR BLD: 1.8 % (ref 0–8)
ERYTHROCYTE [DISTWIDTH] IN BLOOD BY AUTOMATED COUNT: 12.8 % (ref 11.5–14.5)
EST. GFR  (AFRICAN AMERICAN): >60 ML/MIN/1.73 M^2
EST. GFR  (NON AFRICAN AMERICAN): >60 ML/MIN/1.73 M^2
GLUCOSE SERPL-MCNC: 247 MG/DL (ref 70–110)
GLUCOSE SERPL-MCNC: 260 MG/DL (ref 70–110)
GLUCOSE SERPL-MCNC: 283 MG/DL (ref 70–110)
HCT VFR BLD AUTO: 39.6 % (ref 40–54)
HGB BLD-MCNC: 12.9 G/DL (ref 14–18)
IMM GRANULOCYTES # BLD AUTO: 0.12 K/UL (ref 0–0.04)
IMM GRANULOCYTES NFR BLD AUTO: 1.8 % (ref 0–0.5)
LYMPHOCYTES # BLD AUTO: 1.8 K/UL (ref 1–4.8)
LYMPHOCYTES NFR BLD: 26.3 % (ref 18–48)
MAGNESIUM SERPL-MCNC: 1.9 MG/DL (ref 1.6–2.6)
MCH RBC QN AUTO: 29.1 PG (ref 27–31)
MCHC RBC AUTO-ENTMCNC: 32.6 G/DL (ref 32–36)
MCV RBC AUTO: 89 FL (ref 82–98)
MONOCYTES # BLD AUTO: 0.7 K/UL (ref 0.3–1)
MONOCYTES NFR BLD: 10.4 % (ref 4–15)
NEUTROPHILS # BLD AUTO: 4 K/UL (ref 1.8–7.7)
NEUTROPHILS NFR BLD: 59.4 % (ref 38–73)
NRBC BLD-RTO: 0 /100 WBC
PLATELET # BLD AUTO: 214 K/UL (ref 150–350)
PMV BLD AUTO: 10.8 FL (ref 9.2–12.9)
POTASSIUM SERPL-SCNC: 4.2 MMOL/L (ref 3.5–5.1)
RBC # BLD AUTO: 4.44 M/UL (ref 4.6–6.2)
SODIUM SERPL-SCNC: 132 MMOL/L (ref 136–145)
VANCOMYCIN TROUGH SERPL-MCNC: 27.8 UG/ML (ref 10–22)
WBC # BLD AUTO: 6.65 K/UL (ref 3.9–12.7)

## 2020-12-10 PROCEDURE — 99232 PR SUBSEQUENT HOSPITAL CARE,LEVL II: ICD-10-PCS | Mod: ,,, | Performed by: PODIATRIST

## 2020-12-10 PROCEDURE — 80048 BASIC METABOLIC PNL TOTAL CA: CPT

## 2020-12-10 PROCEDURE — 63600175 PHARM REV CODE 636 W HCPCS: Performed by: INTERNAL MEDICINE

## 2020-12-10 PROCEDURE — 63600175 PHARM REV CODE 636 W HCPCS: Performed by: NURSE PRACTITIONER

## 2020-12-10 PROCEDURE — 25000003 PHARM REV CODE 250: Performed by: INTERNAL MEDICINE

## 2020-12-10 PROCEDURE — 83735 ASSAY OF MAGNESIUM: CPT

## 2020-12-10 PROCEDURE — 85025 COMPLETE CBC W/AUTO DIFF WBC: CPT

## 2020-12-10 PROCEDURE — 80202 ASSAY OF VANCOMYCIN: CPT

## 2020-12-10 PROCEDURE — 36415 COLL VENOUS BLD VENIPUNCTURE: CPT

## 2020-12-10 PROCEDURE — 99232 SBSQ HOSP IP/OBS MODERATE 35: CPT | Mod: ,,, | Performed by: PODIATRIST

## 2020-12-10 RX ORDER — MUPIROCIN 20 MG/G
OINTMENT TOPICAL 3 TIMES DAILY
Qty: 1 TUBE | Refills: 0 | Status: SHIPPED | OUTPATIENT
Start: 2020-12-10 | End: 2020-12-17

## 2020-12-10 RX ORDER — LANCING DEVICE
1 EACH MISCELLANEOUS 2 TIMES DAILY WITH MEALS
Qty: 1 EACH | Refills: 0 | Status: SHIPPED | OUTPATIENT
Start: 2020-12-10 | End: 2021-12-10

## 2020-12-10 RX ORDER — INSULIN ASPART 100 [IU]/ML
10 INJECTION, SOLUTION INTRAVENOUS; SUBCUTANEOUS
Qty: 9 ML | Refills: 3 | Status: SHIPPED | OUTPATIENT
Start: 2020-12-10 | End: 2021-01-09

## 2020-12-10 RX ORDER — LANCETS
1 EACH MISCELLANEOUS 2 TIMES DAILY WITH MEALS
Qty: 100 EACH | Refills: 11 | Status: SHIPPED | OUTPATIENT
Start: 2020-12-10

## 2020-12-10 RX ORDER — PEN NEEDLE, DIABETIC 30 GX3/16"
1 NEEDLE, DISPOSABLE MISCELLANEOUS 2 TIMES DAILY WITH MEALS
Qty: 100 EACH | Refills: 11 | Status: SHIPPED | OUTPATIENT
Start: 2020-12-10

## 2020-12-10 RX ADMIN — PIPERACILLIN SODIUM AND TAZOBACTAM SODIUM 4.5 G: 4; .5 INJECTION, POWDER, LYOPHILIZED, FOR SOLUTION INTRAVENOUS at 02:12

## 2020-12-10 RX ADMIN — PIPERACILLIN SODIUM AND TAZOBACTAM SODIUM 4.5 G: 4; .5 INJECTION, POWDER, LYOPHILIZED, FOR SOLUTION INTRAVENOUS at 09:12

## 2020-12-10 RX ADMIN — VANCOMYCIN HYDROCHLORIDE 2000 MG: 1 INJECTION, POWDER, LYOPHILIZED, FOR SOLUTION INTRAVENOUS at 06:12

## 2020-12-10 RX ADMIN — ENOXAPARIN SODIUM 40 MG: 40 INJECTION SUBCUTANEOUS at 09:12

## 2020-12-10 RX ADMIN — HUMAN INSULIN 9 UNITS: 100 INJECTION, SOLUTION SUBCUTANEOUS at 12:12

## 2020-12-10 NOTE — PROGRESS NOTES
Pharmacokinetic Assessment Follow Up: IV Vancomycin    Vancomycin serum concentration assessment(s):    The trough level was drawn correctly and can be used to guide therapy at this time. The measurement is above the desired definitive target range of 15 to 20 mcg/mL.    Vancomycin Regimen Plan:    Change regimen to Vancomycin 2000 mg IV every 10 hours with next serum trough concentration measured at 12/11/20 prior to the dose on 2100    Drug levels (last 3 results):  Recent Labs   Lab Result Units 12/08/20  1316 12/09/20  1417 12/10/20  1255   Vancomycin-Trough ug/mL 10.9 15.8 27.8*       Pharmacy will continue to follow and monitor vancomycin.    Please contact pharmacy at extension [phone] for questions regarding this assessment.    Thank you for the consult,   Twyla Clancy       Patient brief summary:  Omi Julian is a 55 y.o. male initiated on antimicrobial therapy with IV Vancomycin for treatment of bone/joint infection and cellulitis    The patient's current regimen was changed from 2 g IV every 8 hours to 2 g IV every 10 hours    Drug Allergies:   Review of patient's allergies indicates:  No Known Allergies    Actual Body Weight:   168.7 kg    Renal Function:   Estimated Creatinine Clearance: 123.9 mL/min (based on SCr of 1.1 mg/dL).,     Dialysis Method (if applicable):  N/A    CBC (last 72 hours):  Recent Labs   Lab Result Units 12/08/20  0643 12/09/20  0645 12/10/20  0554   WBC K/uL 5.63 6.79 6.65   Hemoglobin g/dL 12.4* 13.4* 12.9*   Hematocrit % 38.6* 40.6 39.6*   Platelets K/uL 189 212 214   Gran % % 56.1 59.8 59.4   Lymph % % 29.3 28.3 26.3   Mono % % 11.2 8.7 10.4   Eosinophil % % 2.3 1.8 1.8   Basophil % % 0.4 0.4 0.3   Differential Method  Automated Automated Automated       Metabolic Panel (last 72 hours):  Recent Labs   Lab Result Units 12/08/20  0643 12/09/20  0645 12/10/20  0554   Sodium mmol/L 130* 130* 132*   Potassium mmol/L 4.4 4.3 4.2   Chloride mmol/L 96 97 100   CO2 mmol/L  26 25 25   Glucose mg/dL 356* 321* 247*   BUN mg/dL 20 19 15   Creatinine mg/dL 1.2 1.1 1.1   Magnesium mg/dL 1.8 2.0 1.9       Vancomycin Administrations:  vancomycin given in the last 96 hours                   vancomycin (VANCOCIN) 2,000 mg in sodium chloride 0.9% 500 mL IVPB (mg) 2,000 mg New Bag 12/10/20 0611     2,000 mg New Bag 12/09/20 2203     2,000 mg New Bag  1548    vancomycin (VANCOCIN) 2,000 mg in dextrose 5 % 500 mL IVPB (mg) 2,000 mg New Bag 12/09/20 0549     2,000 mg New Bag 12/08/20 2300    vancomycin (VANCOCIN) 2,000 mg in dextrose 5 % 500 mL IVPB (mg) 2,000 mg New Bag 12/08/20 1419     2,000 mg New Bag  0202     2,000 mg New Bag 12/07/20 1251    vancomycin in dextrose 5 % 1 gram/250 mL IVPB 2,000 mg (mg) 2,000 mg New Bag 12/06/20 2334                Microbiologic Results:  Microbiology Results (last 7 days)     Procedure Component Value Units Date/Time    Blood culture x two cultures. Draw prior to antibiotics. [[id number]] Collected: 12/06/20 2247    Order Status: Completed Specimen: Blood from Peripheral, Hand, Left Updated: 12/10/20 0232     Blood Culture, Routine No Growth to date      No Growth to date      No Growth to date      No Growth to date    Narrative:      Aerobic and anaerobic    Blood culture x two cultures. Draw prior to antibiotics. [[id number]] Collected: 12/06/20 2247    Order Status: Completed Specimen: Blood from Peripheral, Wrist, Right Updated: 12/10/20 0232     Blood Culture, Routine No Growth to date      No Growth to date      No Growth to date      No Growth to date    Narrative:      Aerobic and anaerobic

## 2020-12-10 NOTE — CONSULTS
Consult noted for DM education. Pt educated 12/9 by RD. F/u today per consult. No questions at this time. [facility] education [facility]. RD card provided for any future needs.    Thanks for consult!    Mar Diaz RD 12/10/2020 1:02 PM

## 2020-12-10 NOTE — HOSPITAL COURSE
55-year-old morbidly obese  male admitted to [facility] for the acute on chronic left foot wound for 3 months and cellulitis with a concern of gangrene, and uncontrolled diabetes not on any medication.  Patient also failed outpatient antibiotic therapy, during the hospital course patient received broad-spectrum antibiotic Vanco Zosyn, he was afebrile without leukocytosis, ESR was normal, Podiatric consult appreciated possible surgical intervention, patient  reported to have purulent discharge in ED, but not noted  in the hospital course, imaging including x-ray,CT and wbc tagged 3 phase bone scan of the foot did not identify any source of osteomyelitis. patient is not a candidate for MRI to evaluate osteomyelitis due to the metal part in the foot.  Hb A1c noted to be 9.5, patient received large dose of IV insulin Levemir and regular insulin, case discussed with podiatris prior to d/c , skin discoloration not relavent to gasgrene, arterial doppler negative for significant stenosis,advised to d/c with 10 days po abx and OP podiatry f/u, insulin prescribed on d/c, educated on diabetic diet and insulin administration and close OP f/u.      Instructions provided to follow up with primary care physician as outpatient. Patient verbalized understanding and is aware to contact primary care physician or return to ED if new or worsening symptoms.    Physical exam on the day of discharge:  General: Patient resting comfortably in no acute distress morbidly obese.  Lungs: CTA. Good air entry.  Cor: Regular rate and rhythm. No murmurs. No pedal edema.  Abd: Soft. Nontender. Non-distended.  Neuro: A&O x3. Moving all 4 extremities equally  Ext: No clubbing. No cyanosis.   Left foot discoloration distally around the 1st 3 toes with edema and tender ulcer on the plantar surface of the 2nd toe

## 2020-12-10 NOTE — NURSING
Told pt that I was going to do the wound care of his foot at the end of my shift, but will have to ask the night nurse to do it because I just did not have the time. Sorry!

## 2020-12-10 NOTE — PLAN OF CARE
Problem: Adult Inpatient Plan of Care  Goal: Plan of Care Review  Outcome: Ongoing, Progressing  Goal: Patient-Specific Goal (Individualization)  Outcome: Ongoing, Progressing  Goal: Absence of Hospital-Acquired Illness or Injury  Outcome: Ongoing, Progressing  Goal: Optimal Comfort and Wellbeing  Outcome: Ongoing, Progressing  Goal: Readiness for Transition of Care  Outcome: Ongoing, Progressing  Goal: Rounds/Family Conference  Outcome: Ongoing, Progressing     Problem: Bariatric Environmental Safety  Goal: Safety Maintained with Care  Outcome: Ongoing, Progressing     Problem: Fall Injury Risk  Goal: Absence of Fall and Fall-Related Injury  Outcome: Ongoing, Progressing     Problem: Skin Injury Risk Increased  Goal: Skin Health and Integrity  Outcome: Ongoing, Progressing     Problem: Diabetes Comorbidity  Goal: Blood Glucose Level Within Desired Range  Outcome: Ongoing, Progressing     Problem: Wound  Goal: Optimal Wound Healing  Outcome: Ongoing, Progressing     Problem: Infection  Goal: Infection Symptom Resolution  Outcome: Ongoing, Progressing

## 2020-12-10 NOTE — PROGRESS NOTES
Identifying data:  55-year-old male    Chief complaint:  Infected left foot status post surgery in September at the [facility]    History of present illness.  Patient surgery in September to be a hospital on his left foot which and recently became edematous and turned dark in color.  He states that an ulcer formed bottom of the 2nd toe on toe became very swollen so he came the emergency department with uncontrolled diabetes.  This is same thing hammertoes right foot with a hyper pigmentation bent resolved over time.    Objective:  I evaluated the foot today the ulcers clean and superficial and plantar surface of the 2nd toe.  The 2nd toe still edematous without erythema and there is no lymphangitis.  The tissue swollen slightly boggy feeling but no actual fluctuance felt at the proximal aspect of the 1st MPJ and 1st intermetatarsal space.    Imaging:  All imaging done is negative for osteomyelitis or abscess.    Assessment:  Resolving cellulitis left foot status post surgery by the [facility]    Plan:  Patient may be discharged home recommend apply topical antibiotic to the ulcer keep foot elevated.  I am recommending he go back to the [facility] to be followed up for the surgery they performed.

## 2020-12-12 LAB
BACTERIA BLD CULT: NORMAL
BACTERIA BLD CULT: NORMAL

## 2020-12-18 NOTE — DISCHARGE SUMMARY
"[facility]  Discharge Summary      Patient Name: Omi Julian  MRN: [id number]  Patient Class: IP- Inpatient  Admission Date: 12/6/2020  Hospital Length of Stay: 4 days  Discharge Date and Time: 12/10/2020  3:10 PM  Attending Physician: No att. providers found   Discharging Provider: Maritza Fox MD  Primary Care Provider: [facility][note 3] ADMINSTRATION      HPI:   55-year-old male morbidly obese male with history of SELMA, on CPAP ("should use more often")  Presented to the emergency department with elevated blood sugar, over 300s for the past 3-4 days.   Also reports nonhealing ulcers on the left foot. [facility].  States that he was given 120 tablets of clindamycin qid in October. Completed a couple days ago.  Seen [facility]iatrist at [facility] about 3 weeks ago and was given "oxygen boot therapy"  Reports fatigue with polyphasia, polydipsia, and polyuria  Has approximately 40 lbs weight gain in the past 4 months  Denies fever, chills  Denies shortness of breath, chest pain, abdominal pain, nausea, vomiting, diarrhea, dysuria  Reports occasional mild to moderate pain on left foot, able to bear weight    Drinks about 2 hard liquor daily  Denies tobacco or recreational drugs  Not on routine medication  Told that he had "boarderline diabetes" many years ago.     In the ED:  Afebrile  Mildly hypertensive  WBC 7.93, hemoglobin 13, hematocrit 39.6, platelet 200  Sodium 128, potassium 4.8, glucose 539  Corrected sodium 135  Lactic acid 1.8  Beta hydroxy butyrate 0.1  Urine with 4+ glucose glucose, no urinary tract infection  Blood cultures done  Started on IV Zosyn  and IV vancomycin  Received 1 L IV bolus in the ED    * No surgery found *      Hospital Course:   55-year-old morbidly obese  male admitted to [facility] for the acute on chronic left foot wound for 3 months and cellulitis with a concern of gangrene, and uncontrolled diabetes " not on any medication.  Patient also failed outpatient antibiotic therapy, during the hospital course patient received broad-spectrum antibiotic Vanco Zosyn, he was afebrile without leukocytosis, ESR was normal, Podiatric consult appreciated possible surgical intervention, patient  reported to have purulent discharge in ED, but not noted  in the hospital course, imaging including x-ray,CT and wbc tagged 3 phase bone scan of the foot did not identify any source of osteomyelitis. patient is not a candidate for MRI to evaluate osteomyelitis due to the metal part in the foot.  Hb A1c noted to be 9.5, patient received large dose of IV insulin Levemir and regular insulin, case discussed with podiatris prior to d/c , skin discoloration not relavent to gasgrene, arterial doppler negative for significant stenosis,advised to d/c with 10 days po abx and OP podiatry f/u, insulin prescribed on d/c, educated on diabetic diet and insulin administration and close OP f/u.      Instructions provided to follow up with primary care physician as outpatient. Patient verbalized understanding and is aware to contact primary care physician or return to ED if new or worsening symptoms.    Physical exam on the day of discharge:  General: Patient resting comfortably in no acute distress morbidly obese.  Lungs: CTA. Good air entry.  Cor: Regular rate and rhythm. No murmurs. No pedal edema.  Abd: Soft. Nontender. Non-distended.  Neuro: A&O x3. Moving all 4 extremities equally  Ext: No clubbing. No cyanosis.   Left foot discoloration distally around the 1st 3 toes with edema and tender ulcer on the plantar surface of the 2nd toe       Consults:   Consults (From admission, onward)        Status Ordering Provider     Inpatient consult to Podiatry  Once     Provider:  Enrique Andrade DPM    Completed DOV DOUGHERTY     Inpatient consult to Registered Dietitian/Nutritionist  Once     Provider:  (Not yet assigned)    Kami ELLIOTT  RAFIA     Inpatient consult to Registered Dietitian/Nutritionist  Once     Provider:  (Not yet assigned)    Completed RAFIA ELLIOTT     IP consult to case management  Once     Provider:  (Not yet assigned)    Completed MARILIA BOB          No new Assessment & Plan notes have been filed under this hospital service since the last note was generated.  Service: Hospital Medicine    Final Active Diagnoses:    Diagnosis Date Noted POA    PRINCIPAL PROBLEM:  Wound cellulitis after surgery [T81.49XA] 12/07/2020 Yes    Hyperglycemia [R73.9]  Yes    Type 2 diabetes mellitus [E11.9] 12/07/2020 Yes    Hypertension [I10] 12/07/2020 Yes    Morbid obesity with BMI of 45.0-49.9, adult [E66.01, Z68.42] 12/07/2020 Not Applicable    Cellulitis [L03.90] 12/06/2020 Yes      Problems Resolved During this Admission:       Discharged Condition: stable    Disposition: Home or Self Care    Follow Up:  Follow-up Information     [facility] In 1 week.    Contact information:  [address] [facility] [id number] [phone]                 Patient Instructions:      Diet Cardiac     Diet diabetic     Leave dressing on - Keep it clean, dry, and intact until clinic visit     Activity as tolerated       Significant Diagnostic Studies: Labs: BMP: No results for input(s): GLU, NA, K, CL, CO2, BUN, CREATININE, CALCIUM, MG in the last 48 hours. and CBC No results for input(s): WBC, HGB, HCT, PLT in the last 48 hours.    Pending Diagnostic Studies:     None         Medications:  Reconciled Home Medications:      Medication List      START taking these medications    blood sugar diagnostic, drum Strp  Commonly known as: ACCU-CHEK COMPACT PLUS TEST  1 strip by Misc.(Non-Drug; Combo Route) route 2 (two) times daily with meals.     blood-glucose meter, drum-type Kit  1 Device by Misc.(Non-Drug; Combo Route) route 2 (two) times daily with meals.     insulin aspart U-100 100 unit/mL  "injection  Commonly known as: NOVOLOG  Inject 10 Units into the skin 3 (three) times daily before meals.     insulin detemir U-100 100 unit/mL (3 mL) Inpn pen  Commonly known as: LEVEMIR FLEXTOUCH  Inject 18 Units into the skin 2 (two) times daily.     lancets Misc  1 each by Misc.(Non-Drug; Combo Route) route 2 (two) times daily with meals.     lancing device Misc  1 Device by Misc.(Non-Drug; Combo Route) route 2 (two) times daily with meals.     pen needle, diabetic 31 gauge x 5/16" Ndle  1 each by Misc.(Non-Drug; Combo Route) route 2 (two) times daily with meals.        STOP taking these medications    clindamycin 300 MG capsule  Commonly known as: CLEOCIN        ASK your doctor about these medications    mupirocin 2 % ointment  Commonly known as: BACTROBAN  Apply topically 3 (three) times daily. for 7 days  Ask about: Should I take this medication?            Indwelling Lines/Drains at time of discharge:   Lines/Drains/Airways     None                 Time spent on the discharge of patient: 33  minutes  Patient was seen and examined on the date of discharge and determined to be suitable for discharge.         Maritza Fox MD  Department of Hospital Medicine  [facility]  "

## 2021-10-31 ENCOUNTER — HOSPITAL ENCOUNTER (EMERGENCY)
Facility: HOSPITAL | Age: 56
Discharge: HOME | End: 2021-11-01
Attending: EMERGENCY MEDICINE
Payer: OTHER GOVERNMENT

## 2021-10-31 DIAGNOSIS — R42 LIGHTHEADEDNESS: Primary | ICD-10-CM

## 2021-10-31 DIAGNOSIS — R42 DIZZINESS: ICD-10-CM

## 2021-10-31 LAB
BASOPHILS # BLD AUTO: 0.03 K/UL (ref 0–0.2)
BASOPHILS NFR BLD: 0.4 % (ref 0–1.9)
DIFFERENTIAL METHOD: ABNORMAL
EOSINOPHIL # BLD AUTO: 0.3 K/UL (ref 0–0.5)
EOSINOPHIL NFR BLD: 3.8 % (ref 0–8)
ERYTHROCYTE [DISTWIDTH] IN BLOOD BY AUTOMATED COUNT: 13.9 % (ref 11.5–14.5)
HCT VFR BLD AUTO: 39.9 % (ref 40–54)
HGB BLD-MCNC: 13 G/DL (ref 14–18)
IMM GRANULOCYTES # BLD AUTO: 0.04 K/UL (ref 0–0.04)
IMM GRANULOCYTES NFR BLD AUTO: 0.6 % (ref 0–0.5)
LYMPHOCYTES # BLD AUTO: 2 K/UL (ref 1–4.8)
LYMPHOCYTES NFR BLD: 29.9 % (ref 18–48)
MCH RBC QN AUTO: 28.1 PG (ref 27–31)
MCHC RBC AUTO-ENTMCNC: 32.6 G/DL (ref 32–36)
MCV RBC AUTO: 86 FL (ref 82–98)
MONOCYTES # BLD AUTO: 0.7 K/UL (ref 0.3–1)
MONOCYTES NFR BLD: 10.1 % (ref 4–15)
NEUTROPHILS # BLD AUTO: 3.8 K/UL (ref 1.8–7.7)
NEUTROPHILS NFR BLD: 55.2 % (ref 38–73)
NRBC BLD-RTO: 0 /100 WBC
PLATELET # BLD AUTO: 208 K/UL (ref 150–450)
PMV BLD AUTO: 10.1 FL (ref 9.2–12.9)
RBC # BLD AUTO: 4.62 M/UL (ref 4.6–6.2)
WBC # BLD AUTO: 6.83 K/UL (ref 3.9–12.7)

## 2021-10-31 PROCEDURE — 80053 COMPREHEN METABOLIC PANEL: CPT | Performed by: EMERGENCY MEDICINE

## 2021-10-31 PROCEDURE — 93010 EKG 12-LEAD: ICD-10-PCS | Mod: ,,, | Performed by: INTERNAL MEDICINE

## 2021-10-31 PROCEDURE — 93010 ELECTROCARDIOGRAM REPORT: CPT | Mod: ,,, | Performed by: INTERNAL MEDICINE

## 2021-10-31 PROCEDURE — 96360 HYDRATION IV INFUSION INIT: CPT

## 2021-10-31 PROCEDURE — 83735 ASSAY OF MAGNESIUM: CPT | Performed by: EMERGENCY MEDICINE

## 2021-10-31 PROCEDURE — 99284 EMERGENCY DEPT VISIT MOD MDM: CPT | Mod: 25

## 2021-10-31 PROCEDURE — 93005 ELECTROCARDIOGRAM TRACING: CPT | Performed by: INTERNAL MEDICINE

## 2021-10-31 PROCEDURE — 25000003 PHARM REV CODE 250: Performed by: EMERGENCY MEDICINE

## 2021-10-31 PROCEDURE — 85025 COMPLETE CBC W/AUTO DIFF WBC: CPT | Performed by: EMERGENCY MEDICINE

## 2021-10-31 RX ORDER — MECLIZINE HCL 12.5 MG 12.5 MG/1
50 TABLET ORAL
Status: COMPLETED | OUTPATIENT
Start: 2021-10-31 | End: 2021-10-31

## 2021-10-31 RX ADMIN — SODIUM CHLORIDE 1000 ML: 0.9 INJECTION, SOLUTION INTRAVENOUS at 11:10

## 2021-10-31 RX ADMIN — MECLIZINE HYDROCHLORIDE 50 MG: 12.5 TABLET ORAL at 11:10

## 2021-11-01 VITALS
RESPIRATION RATE: 20 BRPM | SYSTOLIC BLOOD PRESSURE: 162 MMHG | TEMPERATURE: 98 F | DIASTOLIC BLOOD PRESSURE: 74 MMHG | HEART RATE: 70 BPM | BODY MASS INDEX: 40.43 KG/M2 | HEIGHT: 74 IN | WEIGHT: 315 LBS | OXYGEN SATURATION: 95 %

## 2021-11-01 LAB
ALBUMIN SERPL BCP-MCNC: 3.9 G/DL (ref 3.5–5.2)
ALP SERPL-CCNC: 62 U/L (ref 55–135)
ALT SERPL W/O P-5'-P-CCNC: 25 U/L (ref 10–44)
ANION GAP SERPL CALC-SCNC: 11 MMOL/L (ref 8–16)
AST SERPL-CCNC: 22 U/L (ref 10–40)
BILIRUB SERPL-MCNC: 0.4 MG/DL (ref 0.1–1)
BUN SERPL-MCNC: 18 MG/DL (ref 6–20)
CALCIUM SERPL-MCNC: 9.4 MG/DL (ref 8.7–10.5)
CHLORIDE SERPL-SCNC: 107 MMOL/L (ref 95–110)
CO2 SERPL-SCNC: 24 MMOL/L (ref 23–29)
CREAT SERPL-MCNC: 1.3 MG/DL (ref 0.5–1.4)
EST. GFR  (AFRICAN AMERICAN): >60 ML/MIN/1.73 M^2
EST. GFR  (NON AFRICAN AMERICAN): >60 ML/MIN/1.73 M^2
GLUCOSE SERPL-MCNC: 139 MG/DL (ref 70–110)
MAGNESIUM SERPL-MCNC: 1.8 MG/DL (ref 1.6–2.6)
POTASSIUM SERPL-SCNC: 4 MMOL/L (ref 3.5–5.1)
PROT SERPL-MCNC: 7.4 G/DL (ref 6–8.4)
SODIUM SERPL-SCNC: 142 MMOL/L (ref 136–145)

## 2021-11-01 RX ORDER — MECLIZINE HYDROCHLORIDE 25 MG/1
50 TABLET ORAL 2 TIMES DAILY PRN
Qty: 30 TABLET | Refills: 0 | Status: SHIPPED | OUTPATIENT
Start: 2021-11-01 | End: 2021-11-16

## 2022-07-08 ENCOUNTER — HOSPITAL ENCOUNTER (EMERGENCY)
Facility: HOSPITAL | Age: 57
Discharge: HOME | End: 2022-07-08
Attending: EMERGENCY MEDICINE
Payer: OTHER GOVERNMENT

## 2022-07-08 VITALS
HEART RATE: 63 BPM | RESPIRATION RATE: 18 BRPM | BODY MASS INDEX: 44.3 KG/M2 | WEIGHT: 315 LBS | DIASTOLIC BLOOD PRESSURE: 96 MMHG | TEMPERATURE: 98 F | SYSTOLIC BLOOD PRESSURE: 165 MMHG | OXYGEN SATURATION: 98 %

## 2022-07-08 DIAGNOSIS — M79.642 LEFT HAND PAIN: Primary | ICD-10-CM

## 2022-07-08 PROCEDURE — 99283 EMERGENCY DEPT VISIT LOW MDM: CPT

## 2022-07-08 RX ORDER — NAPROXEN 500 MG/1
500 TABLET ORAL 2 TIMES DAILY PRN
Qty: 20 TABLET | Refills: 0 | Status: SHIPPED | OUTPATIENT
Start: 2022-07-08

## 2022-07-08 NOTE — ED NOTES
pt d/c to home. ambulatory at d/c. no acute distress noted. verablized understanding of d/c instructions. f/u appts. no questions at this time. rx given x1. Copies of dc papers sent home with pt

## 2022-07-08 NOTE — ED PROVIDER NOTES
Encounter Date: 7/8/2022       History     Chief Complaint   Patient presents with    Hand Pain     L hand pain and swelling , denies any injury. States stretched L hand and felt pain     57-year-old male reports that he has had pain and swelling to the middle finger of his left hand for the past 4 days patient reports that he was upset with someone and extended his middle finger in a route hand gesture very forcefully and felt discomfort in the knuckle of his 3rd finger of the left hand patient reports that there was no contact or trauma to this finger however he experienced swelling and discomfort to his hand after the incident.  Patient reports he has retained full range of motion of his hand although there was some mild decreased range of motion secondary to swelling and discomfort.  Patient reports that in the days following this incident the swelling has decreased somewhat and his range of motion has improved.  Patient reports no other injuries or complaints patient reports no previous injury to this extremity no previous surgeries.  Patient reports that he is not on blood thinners.        Review of patient's allergies indicates:  No Known Allergies  Past Medical History:   Diagnosis Date    Hypertension 12/7/2020    Sleep apnea      No past surgical history on file.  Family History   Problem Relation Age of Onset    Diabetes Mother     Hypertension Mother     Diabetes Father     Hypertension Father      Social History     Tobacco Use    Smoking status: Never Smoker   Substance Use Topics    Alcohol use: Yes     Alcohol/week: 14.0 standard drinks     Types: 14 Shots of liquor per week    Drug use: Never     Review of Systems   Constitutional: Negative for fever.   HENT: Negative for congestion, rhinorrhea, sore throat and trouble swallowing.    Eyes: Negative for visual disturbance.   Respiratory: Negative for cough, chest tightness, shortness of breath and wheezing.    Cardiovascular: Negative for  chest pain, palpitations and leg swelling.   Gastrointestinal: Negative for abdominal distention, abdominal pain, constipation, diarrhea, nausea and vomiting.   Genitourinary: Negative for difficulty urinating, dysuria, flank pain and frequency.   Musculoskeletal: Positive for arthralgias. Negative for back pain, joint swelling and neck pain.   Skin: Negative for color change and rash.   Neurological: Negative for dizziness, syncope, speech difficulty, weakness, numbness and headaches.   All other systems reviewed and are negative.      Physical Exam     Initial Vitals [07/08/22 0656]   BP Pulse Resp Temp SpO2   (!) 165/96 63 18 98 °F (36.7 °C) 98 %      MAP       --         Physical Exam    Nursing note and vitals reviewed.  Constitutional: He appears well-developed and well-nourished. He is not diaphoretic. No distress.   HENT:   Head: Normocephalic and atraumatic.   Right Ear: External ear normal.   Left Ear: External ear normal.   Nose: Nose normal.   Mouth/Throat: Oropharynx is clear and moist. No oropharyngeal exudate.   Eyes: Conjunctivae and EOM are normal. Pupils are equal, round, and reactive to light. Right eye exhibits no discharge. Left eye exhibits no discharge. No scleral icterus.   Neck: Neck supple. No thyromegaly present. No tracheal deviation present. No JVD present.   Normal range of motion.  Cardiovascular: Normal rate, regular rhythm, normal heart sounds and intact distal pulses. Exam reveals no gallop and no friction rub.    No murmur heard.  Pulmonary/Chest: Breath sounds normal. No stridor. No respiratory distress. He has no wheezes. He has no rhonchi. He has no rales. He exhibits no tenderness.   Abdominal: Abdomen is soft. Bowel sounds are normal. He exhibits no distension and no mass. There is no abdominal tenderness. There is no rebound and no guarding.   Musculoskeletal:         General: No tenderness or edema. Normal range of motion.      Cervical back: Normal range of motion and neck  supple.      Comments: Patient has no tenderness to palpation on exam, patient has full range of motion of his hand, patient does have mildly decreased lateral motion of his 3rd finger on the left-hand towards his thumb but full flexion and extension, there is mild swelling noted to the area of the knuckle on the 3rd digit of the left hand.  There is no palpable warmth, swelling is mild but patient reports that it was worse before.  Patient has normal capillary refill patient has 2+ radial and ulnar pulses, patient has no ligamentous laxity or scissoring of his digit when he flexes or extends     Lymphadenopathy:     He has no cervical adenopathy.   Neurological: He is alert and oriented to person, place, and time. He has normal strength. No cranial nerve deficit or sensory deficit.   Skin: Skin is warm and dry. No rash noted. No erythema.         ED Course   Procedures  Labs Reviewed - No data to display       Imaging Results          X-Ray Hand 3 view Left (Final result)  Result time 07/08/22 08:30:54    Final result by Nae Callahan MD (07/08/22 08:30:54)                 Narrative:    Three views of the left hand    Clinical history is pain    There are no fractures, dislocations or acute osseous abnormalities. There is joint space narrowing of the radiocarpal joint and distal radial ulnar joint. There is widening of the scapholunate joint which may represent a ligamentous injury.    IMPRESSION: Widening of the scapholunate joint suggestive of ligamentous injury    Degenerative changes of the radial carpal joint and distal radial ulnar joint    Electronically signed by:  Nae Callahan MD  7/8/2022 8:30 AM CDT Workstation: [id number]                               Medications - No data to display  Medical Decision Making:   History:   Old Medical Records: I decided to obtain old medical records.  Initial Assessment:   Emergent evaluation of a 57-year-old male presenting with an episode of pain to the  middle finger, pain has abated somewhat hand at this time patient has full range of motion, differential diagnosis includes soft tissue injury, tendon injury, dislocation, fracture.  I have low suspicion for dislocation or fracture given physical exam, patient has no sign of overt tendon injury given his full range of motion however patient does have mild difficulty deviating finger towards thumb but he is able to do so.  Case discussed with Orthopedics patient will be placed in splint and referred to Orthopedics for further evaluation and management.  Patient is advised to return immediately to the ER for any worsening or for any further concerns.  Patient placed in finger splint and Velcro wrist splint.  Patient's imaging shows mild widening of the scapholunate junction.            Attending Attestation:             Attending ED Notes:   I had a detailed discussion with the patient and/or guardian regarding: The historical points, exam findings, and diagnostic results supporting the discharge diagnosis, lab results, pertinent radiology results, and the need for outpatient follow-up, for definitive care with a family practitioner and to return to the emergency department if symptoms worsen or persist or if there are any questions or concerns that arise at home. All questions have been answered in detail. Strict return to Emergency Department precautions have been provided.     A dictation software program was used for this note.  Please expect some simple typographical  errors in this note.     This patient was seen during the context of the Covid 19 global pandemic where local, state, hospital guidelines, were followed to the best of ability given the circumstances of the pandemic.                   Clinical Impression:   Final diagnoses:  [M79.642] Left hand pain (Primary)          ED Disposition Condition    Discharge Stable        ED Prescriptions     Medication Sig Dispense Start Date End Date Auth. Provider     naproxen (NAPROSYN) 500 MG tablet Take 1 tablet (500 mg total) by mouth 2 (two) times daily as needed (As needed for pain, take with meals). 20 tablet 7/8/2022  Manuel Best MD        Follow-up Information     Follow up With Specialties Details Why Contact Info Additional Information    [facility] - Emergency Dept Emergency Medicine  If symptoms worsen [address] [facility] [facility] [id number] [phone] 1st floor    Christoph Cutler MD Hand Surgery Schedule an appointment as soon as possible for a visit in 2 days  [address] [facility]  [address]  [place]ENTER [facility] [address]  [phone]              Manuel Best MD  07/08/22 [id number]

## 2025-06-23 ENCOUNTER — HOSPITAL ENCOUNTER (EMERGENCY)
Facility: HOSPITAL | Age: 60
Discharge: HOME | End: 2025-06-23
Attending: EMERGENCY MEDICINE
Payer: OTHER GOVERNMENT

## 2025-06-23 VITALS
SYSTOLIC BLOOD PRESSURE: 162 MMHG | WEIGHT: 260 LBS | HEART RATE: 61 BPM | OXYGEN SATURATION: 100 % | RESPIRATION RATE: 17 BRPM | DIASTOLIC BLOOD PRESSURE: 84 MMHG | BODY MASS INDEX: 33.37 KG/M2 | HEIGHT: 74 IN | TEMPERATURE: 98 F

## 2025-06-23 DIAGNOSIS — R10.9 RIGHT FLANK PAIN: Primary | ICD-10-CM

## 2025-06-23 LAB
ABSOLUTE EOSINOPHIL (SMH): 0.04 K/UL
ABSOLUTE MONOCYTE (SMH): 0.67 K/UL (ref 0.3–1)
ABSOLUTE NEUTROPHIL COUNT (SMH): 3.3 K/UL (ref 1.8–7.7)
ALBUMIN SERPL-MCNC: 3.9 G/DL (ref 3.5–5.2)
ALP SERPL-CCNC: 79 UNIT/L (ref 55–135)
ALT SERPL-CCNC: 17 UNIT/L (ref 10–44)
ANION GAP (SMH): 5 MMOL/L (ref 8–16)
AST SERPL-CCNC: 17 UNIT/L (ref 10–40)
BASOPHILS # BLD AUTO: 0.03 K/UL
BASOPHILS NFR BLD AUTO: 0.5 %
BILIRUB SERPL-MCNC: 0.6 MG/DL (ref 0.1–1)
BILIRUB UR QL STRIP.AUTO: NEGATIVE
BUN SERPL-MCNC: 11 MG/DL (ref 8–23)
CALCIUM SERPL-MCNC: 8.8 MG/DL (ref 8.7–10.5)
CHLORIDE SERPL-SCNC: 104 MMOL/L (ref 95–110)
CLARITY UR: CLEAR
CO2 SERPL-SCNC: 29 MMOL/L (ref 23–29)
COLOR UR AUTO: YELLOW
CREAT SERPL-MCNC: 1 MG/DL (ref 0.5–1.4)
ERYTHROCYTE [DISTWIDTH] IN BLOOD BY AUTOMATED COUNT: 13.6 % (ref 11.5–14.5)
GFR SERPLBLD CREATININE-BSD FMLA CKD-EPI: >60 ML/MIN/1.73/M2
GLUCOSE SERPL-MCNC: 106 MG/DL (ref 70–110)
GLUCOSE UR QL STRIP: NEGATIVE
HCT VFR BLD AUTO: 40.9 % (ref 40–54)
HGB BLD-MCNC: 13 GM/DL (ref 14–18)
HGB UR QL STRIP: NEGATIVE
IMM GRANULOCYTES # BLD AUTO: 0.03 K/UL (ref 0–0.04)
IMM GRANULOCYTES NFR BLD AUTO: 0.5 % (ref 0–0.5)
KETONES UR QL STRIP: NEGATIVE
LEUKOCYTE ESTERASE UR QL STRIP: NEGATIVE
LYMPHOCYTES # BLD AUTO: 1.88 K/UL (ref 1–4.8)
MCH RBC QN AUTO: 29.3 PG (ref 27–31)
MCHC RBC AUTO-ENTMCNC: 31.8 G/DL (ref 32–36)
MCV RBC AUTO: 92 FL (ref 82–98)
NITRITE UR QL STRIP: NEGATIVE
NUCLEATED RBC (/100WBC) (SMH): 0 /100 WBC
PH UR STRIP: 7 [PH]
PLATELET # BLD AUTO: 190 K/UL (ref 150–450)
PMV BLD AUTO: 10 FL (ref 9.2–12.9)
POTASSIUM SERPL-SCNC: 3.6 MMOL/L (ref 3.5–5.1)
PROT SERPL-MCNC: 7.1 GM/DL (ref 6–8.4)
PROT UR QL STRIP: NEGATIVE
RBC # BLD AUTO: 4.43 M/UL (ref 4.6–6.2)
RELATIVE EOSINOPHIL (SMH): 0.7 % (ref 0–8)
RELATIVE LYMPHOCYTE (SMH): 31.8 % (ref 18–48)
RELATIVE MONOCYTE (SMH): 11.3 % (ref 4–15)
RELATIVE NEUTROPHIL (SMH): 55.2 % (ref 38–73)
SODIUM SERPL-SCNC: 138 MMOL/L (ref 136–145)
SP GR UR STRIP: >=1.03
UROBILINOGEN UR STRIP-ACNC: >=8 EU/DL
WBC # BLD AUTO: 5.91 K/UL (ref 3.9–12.7)

## 2025-06-23 PROCEDURE — 85025 COMPLETE CBC W/AUTO DIFF WBC: CPT

## 2025-06-23 PROCEDURE — 81003 URINALYSIS AUTO W/O SCOPE: CPT

## 2025-06-23 PROCEDURE — 82040 ASSAY OF SERUM ALBUMIN: CPT

## 2025-06-23 PROCEDURE — 99284 EMERGENCY DEPT VISIT MOD MDM: CPT | Mod: 25

## 2025-06-23 PROCEDURE — 25000003 PHARM REV CODE 250

## 2025-06-23 RX ORDER — SODIUM CHLORIDE 9 MG/ML
1000 INJECTION, SOLUTION INTRAVENOUS
Status: COMPLETED | OUTPATIENT
Start: 2025-06-23 | End: 2025-06-23

## 2025-06-23 RX ORDER — ACETAMINOPHEN 500 MG
1000 TABLET ORAL
Status: DISCONTINUED | OUTPATIENT
Start: 2025-06-23 | End: 2025-06-23

## 2025-06-23 RX ADMIN — SODIUM CHLORIDE 1000 ML: 9 INJECTION, SOLUTION INTRAVENOUS at 09:06

## 2025-06-23 NOTE — DISCHARGE INSTRUCTIONS
Please follow up with the primary care provider regarding your visit here today.  Your lab work is unremarkable for any infection in your urine or blood.  Your CT does not show any signs of urinary tract infection or kidney infection.  There is an incidental finding of the right hepatic lobe cyst which can be followed up outpatient.  This is nonemergent.  I have attached him education of the back here discharge paperwork.  Please return to the emergency department if develop any worsening symptoms accompanied by fever or vomiting.

## 2025-06-23 NOTE — ED PROVIDER NOTES
Encounter Date: 6/23/2025       History     Chief Complaint   Patient presents with    Flank Pain     Right sided flank pain x1 month.  Denies any dysuria.  Worse with movement.       60-year-old male with past medical history of pre-diabetes, hypertension presents to the emergency department for flank pain x1 month.  Patient states that it is on the right side.  He describes it as a dull aching pain that is constant but fluctuates in severity.  It does not radiate.  Movement makes it worse and rest makes it better.  States that he has never had a kidney stone but feels like he may have one now.  He does not recall doing anything that may have pulled a muscle.  He is denying any blood in the urine, dysuria, cloudy urine, fevers, night sweats, chills, unintentional weight loss.  He has not tried any medication for this pain.  He is denying abdominal pain or radiation into the groin region.        Review of patient's allergies indicates:  No Known Allergies  Past Medical History:   Diagnosis Date    Hypertension 12/7/2020    Sleep apnea      History reviewed. No pertinent surgical history.  Family History   Problem Relation Name Age of Onset    Diabetes Mother      Hypertension Mother      Diabetes Father      Hypertension Father       Social History[1]  Review of Systems   Constitutional: Negative.    Respiratory: Negative.     Cardiovascular: Negative.    Gastrointestinal: Negative.    Genitourinary:  Positive for flank pain.       Physical Exam     Initial Vitals [06/23/25 0934]   BP Pulse Resp Temp SpO2   (!) 154/87 78 18 97.7 °F (36.5 °C) 100 %      MAP       --         Physical Exam    Vitals reviewed.  Constitutional: He appears well-developed and well-nourished. He is not diaphoretic. No distress.   HENT: Mouth/Throat: Oropharynx is clear and moist.   Eyes: Conjunctivae and EOM are normal. Right eye exhibits no discharge. Left eye exhibits no discharge.   Neck: Neck supple.   Normal range of  motion.  Cardiovascular:  Normal rate, regular rhythm, normal heart sounds and intact distal pulses.           Pulmonary/Chest: Breath sounds normal. No respiratory distress.   Abdominal: Abdomen is soft. He exhibits no distension.   Musculoskeletal:         General: Normal range of motion.      Cervical back: Normal range of motion and neck supple.        Back:       Comments: No isolated midline tenderness.  No CVA tenderness.  Full range of motion in all extremities.  Pain reproducible with movement of the spine     Neurological: He is alert and oriented to person, place, and time. He has normal strength. No sensory deficit. GCS score is 15. GCS eye subscore is 4. GCS verbal subscore is 5. GCS motor subscore is 6.   Skin: Skin is warm. Capillary refill takes less than 2 seconds.         ED Course   Procedures  Labs Reviewed   URINALYSIS, REFLEX TO URINE CULTURE - Abnormal       Result Value    Color, UA Yellow      Appearance, UA Clear      Spec Grav UA >=1.030 (*)     pH, UA 7.0      Protein, UA Negative      Glucose, UA Negative      Ketones, UA Negative      Blood, UA Negative      Bilirubin, UA Negative      Urobilinogen, UA >=8.0 (*)     Nitrites, UA Negative      Leukocyte Esterase, UA Negative     COMPREHENSIVE METABOLIC PANEL - Abnormal    Sodium 138      Potassium 3.6      Chloride 104      CO2 29      Glucose 106      BUN 11      Creatinine 1.0      Calcium 8.8      Protein Total 7.1      Albumin 3.9      Bilirubin Total 0.6      ALP 79      AST 17      ALT 17      Anion Gap 5 (*)     eGFR >60     CBC WITH DIFFERENTIAL - Abnormal    WBC 5.91      RBC 4.43 (*)     Hgb 13.0 (*)     Hct 40.9      MCV 92      MCH 29.3      MCHC 31.8 (*)     RDW 13.6      Platelet Count 190      MPV 10.0      Nucleated RBC 0      Neut % 55.2      Lymph % 31.8      Mono % 11.3      Eos % 0.7      Basophil % 0.5      Imm Grans % 0.5      Neut # 3.3      Lymph # 1.88      Mono # 0.67      Eos # 0.04      Baso # 0.03      Imm  Grans # 0.03     CBC W/ AUTO DIFFERENTIAL    Narrative:     The following orders were created for panel order CBC auto differential.  Procedure                               Abnormality         Status                     ---------                               -----------         ------                     CBC with Differential[[id number]]        Abnormal            Final result                 Please view results for these tests on the individual orders.          Imaging Results              CT Renal Stone Study ABD Pelvis WO (Final result)  Result time 06/23/25 11:33:24      Final result by eJan Cole MD (06/23/25 11:33:24)                   Impression:      No CT evidence of acute pathology involving the abdomen or pelvis.    Specifically, negative for urinary tract calculi or hydronephrosis.      Electronically signed by: Jean Cole  Date:    06/23/2025  Time:    11:33               Narrative:    EXAMINATION:  CT RENAL STONE STUDY ABD PELVIS WO    CLINICAL HISTORY:  Flank pain, kidney stone suspected;    TECHNIQUE:  Axial CT imaging obtained through the abdomen and pelvis without contrast, coronal and sagittal reformatted images    CMS Mandated Quality Data-CT Radiation Dose-436    All CT scans at this facility dose modulation, iterative reconstruction, and or weight-based dosing when appropriate to reduce radiation dose to as low as reasonably achievable.    COMPARISON:  None    FINDINGS:  Lung bases are clear.  Bone window images show no acute or aggressive osseous abnormality.  Degenerative discopathy at L5-S1.    Right hepatic lobe cyst.  No other hepatic abnormality.  Gallbladder and biliary tree are unremarkable.  Spleen, pancreas, and adrenal glands are unremarkable.  Small bilateral renal cysts.  No renal calculi.  No hydronephrosis.  Ureters are normal in caliber.  Urinary bladder is unremarkable.    Stomach is unremarkable.  No evidence of small-bowel obstruction.  Normal appendix.  No  findings of colitis.  Scattered aortic atherosclerotic calcification.  No free fluid or free air.  No lymphadenopathy.  Small fat containing umbilical hernia.                                       Medications   0.9% NaCl infusion (0 mLs Intravenous Stopped 6/23/25 1014)     Medical Decision Making  60-year-old male presents to the emergency department for right flank pain x1 month    Considerations include but not limited to musculoskeletal injury, UTI, pyelonephritis, nephrolithiasis, renal infarct    Vitals stable.  Patient afebrile.  Well-appearing on physical exam.  Nontoxic and in no acute distress.  Patient is very pleasant and joking with me in the room.  Reports that on a normal day this pain is a 10/10 however today he woke up and it was less severe than it has been since it started.  It is now 4/10.  Reports that at rest he is feeling fine but movement it begins to hurt.  Patient is requesting that imaging be performed as he is very concerned that he has a kidney stone.  His workup here is largely unremarkable without signs of infection in his blood work or his urine.  CT is negative for evidence of acute pathology in the abdomen or pelvis.  Negative for urinary tract calculi or hydronephrosis.  Patient did have an incidental finding of a right hepatic lobe cyst which I did discuss with him.  Informed him that he needs to follow up outpatient as this is likely musculoskeletal.  He is declining any medication here in at discharge.  He was given strict return precautions regarding the development of any new symptoms accompanying this back pain.  He verbalizes understanding and agreed.  Plan also discussed with my attending and all questions were answered at the bedside.    Amount and/or Complexity of Data Reviewed  Labs: ordered.  Radiology: ordered.    Risk  Prescription drug management.                                      Clinical Impression:  Final diagnoses:  [R10.9] Right flank pain (Primary)           ED Disposition Condition    Discharge Stable          ED Prescriptions    None       Follow-up Information       Follow up With Specialties Details Why Contact Info    [facility]  Call   [address] [facility] [address] [phone]                     [1]   Social History  Tobacco Use    Smoking status: Never   Substance Use Topics    Alcohol use: Yes     Alcohol/week: 14.0 standard drinks of alcohol     Types: 14 Shots of liquor per week    Drug use: Never        Gwen Holcomb PA-C  06/23/25 [id number]